# Patient Record
Sex: MALE | Race: OTHER
[De-identification: names, ages, dates, MRNs, and addresses within clinical notes are randomized per-mention and may not be internally consistent; named-entity substitution may affect disease eponyms.]

---

## 2018-11-03 ENCOUNTER — HOSPITAL ENCOUNTER (EMERGENCY)
Dept: HOSPITAL 72 - EMR | Age: 28
Discharge: HOME | End: 2018-11-03
Payer: MEDICAID

## 2018-11-03 VITALS — SYSTOLIC BLOOD PRESSURE: 153 MMHG | DIASTOLIC BLOOD PRESSURE: 94 MMHG

## 2018-11-03 VITALS — WEIGHT: 140 LBS | HEIGHT: 64 IN | BODY MASS INDEX: 23.9 KG/M2

## 2018-11-03 VITALS — DIASTOLIC BLOOD PRESSURE: 94 MMHG | SYSTOLIC BLOOD PRESSURE: 153 MMHG

## 2018-11-03 DIAGNOSIS — S29.012A: Primary | ICD-10-CM
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
## 2018-11-03 PROCEDURE — 99283 EMERGENCY DEPT VISIT LOW MDM: CPT

## 2018-11-03 NOTE — EMERGENCY ROOM REPORT
History of Present Illness


General


Chief Complaint:  Back Injury


Source:  Patient





Present Illness


HPI


28-year-old male presents to the emergency department complaining of 7 out of 

10 in severity localized pain that he describes as tight and cramping in nature 

in the left upper back 3 days.  Patient reports that he was lifting and 

pulling heavy objects at work and believes this is what caused the injury.  

Patient states that he did not report his injury to his employer and no longer 

works there either.  Patient denies trauma or fall he denies chest pain, 

shortness of breath or dyspnea.  Patient denies midline neck or back pain.  

Denies numbness tingling or loss of sensation or gross motor movements of the 

extremities, incontinence of bowel or bladder. Denies CP, Palpitations, LOC, AMS

, dizziness, Changes in Vision, weakness or a sudden severe headache.


Allergies:  


Coded Allergies:  


     No Known Allergies (Unverified , 11/3/18)





Patient History


Past Medical History:  see triage record


Past Surgical History:  none


Pertinent Family History:  none


Reviewed Nursing Documentation:  PMH: Agreed; PSxH: Agreed





Nursing Documentation-PMH


Past Medical History:  No History, Except For


Hx Gastrointestinal Problems:  Yes





Review of Systems


All Other Systems:  negative except mentioned in HPI





Physical Exam





Vital Signs








  Date Time  Temp Pulse Resp B/P (MAP) Pulse Ox O2 Delivery O2 Flow Rate FiO2


 


11/3/18 13:34 98.2 64 20 153/94 97 Room Air  








Sp02 EP Interpretation:  reviewed, normal


General Appearance:  no apparent distress, alert, GCS 15, non-toxic


Head:  normocephalic, atraumatic


Eyes:  bilateral eye normal inspection, bilateral eye PERRL


ENT:  hearing grossly normal, normal voice


Neck:  full range of motion


Respiratory:  chest non-tender, lungs clear, normal breath sounds, speaking 

full sentences


Cardiovascular #1:  regular rate, rhythm


Rectal:  deferred


Musculoskeletal:  back normal, gait/station normal, normal range of motion, 

other - no winged scapula on exam, tender - TTP left rhomboid and left sided 

thoracic paraspinal musculature, no bony ttp, FROM of shoulder.


Neurologic:  alert, oriented x3, responsive, motor strength/tone normal, 

sensory intact, normal gait, speech normal, grossly normal


Psychiatric:  judgement/insight normal


Skin:  normal color, no rash, warm/dry, well hydrated





Medical Decision Making


PA Attestation


Dr. Barnett  is my supervising Physician whom patient management has been 

discussed with.


Diagnostic Impression:  


 Primary Impression:  


 Muscle strain of left upper back


 Qualified Codes:  S29.012A - Strain of muscle and tendon of back wall of thorax

, initial encounter


 Additional Impressions:  


 Muscle spasm of back


 Rhomboid muscle strain


 Qualified Codes:  S29.012A - Strain of muscle and tendon of back wall of thorax

, initial encounter


ER Course


28-year-old male presents to the emergency department complaining of 7 out of 

10 in severity localized pain that he describes as tight and cramping in nature 

in the left upper back 3 days.  Patient reports that he was lifting and 

pulling heavy objects at work and believes this is what caused the injury.  

Patient states that he did not report his injury to his employer and no longer 

works there either.  Patient denies trauma or fall he denies chest pain, 

shortness of breath or dyspnea.  Patient denies midline neck or back pain.  

Denies numbness tingling or loss of sensation or gross motor movements of the 

extremities, incontinence of bowel or bladder. Denies CP, Palpitations, LOC, AMS

, dizziness, Changes in Vision, weakness or a sudden severe headache. 





Ddx considered but are not limited to Fracture, dislocation, contusion, 

epidural abscess, Sprain/Strain/Spasm





Vital signs: are WNL, pt. is afebrile


H&PE are most consistent with muscle strain and spasm of left upper back - 

rhomboid and left thoracic paraspinal musculature





ORDERS: none required at this time.





ED INTERVENTIONS: 


-Soma PO


-Tylenol PO


-Lidoderm TP





-I do not identify an emergent condition at this time. With current presentation

,  pt. is stable for close outpatient follow up and conservative treatment.  D/

w pt. to return promptly to ED with worsening or new symptoms.- Pt. verbalizes' 

understanding and agreement with proposed treatment plan.








DISCHARGE: At this time pt. is stable for d/c to home. Will provide printed 

patient care instructions, and any necessary prescriptions. Care plan and 

follow up instructions have been discussed with the patient prior to discharge.





Last Vital Signs








  Date Time  Temp Pulse Resp B/P (MAP) Pulse Ox O2 Delivery O2 Flow Rate FiO2


 


11/3/18 13:50 98.2 64 20 153/94 97 Room Air  








Disposition:  HOME, SELF-CARE


Condition:  Stable


Scripts


Acetaminophen* (TYLENOL EXTRA STRENGTH*) 500 Mg Tablet


500 MG ORAL Q6H, #20 TAB 0 Refills


   Prov: Vee Euceda         11/3/18 


Methocarbamol* (ROBAXIN*) 500 Mg Tablet


1000 MG PO TID for 7 Days, #42 TAB 0 Refills


   Prov: Vee Euceda         11/3/18


Referrals:  


NOT CHOSEN IPA/MD,REFERRING (PCP)


Patient Instructions:  Back Pain, Adult, Muscle Strain, Easy-to-Read





Additional Instructions:  


Take medications as directed. 


 ** Follow up with a Primary Care Provider in 3-5 days, even if your symptoms 

have resolved. ** 


--Please review list of primary care clinics, if you do not already have a 

primary care provider





Return sooner to ED if new symptoms occur, or current symptoms become worse. 


Do not drink alcohol, drive, or operate heavy machinery while taking Robaxin ( 

Muscle Relaxers) as this may cause drowsiness. 











- Please note that this Emergency Department Report was dictated using [employer] technology software, occasionally this can lead to 

erroneous entry secondary to interpretation by the dictation equipment.











Vee Euceda Nov 3, 2018 14:13

## 2018-12-10 ENCOUNTER — HOSPITAL ENCOUNTER (INPATIENT)
Dept: HOSPITAL 72 - EMR | Age: 28
LOS: 7 days | Discharge: HOME | DRG: 282 | End: 2018-12-17
Payer: MEDICAID

## 2018-12-10 VITALS — SYSTOLIC BLOOD PRESSURE: 135 MMHG | DIASTOLIC BLOOD PRESSURE: 93 MMHG

## 2018-12-10 VITALS — SYSTOLIC BLOOD PRESSURE: 154 MMHG | DIASTOLIC BLOOD PRESSURE: 90 MMHG

## 2018-12-10 VITALS — SYSTOLIC BLOOD PRESSURE: 166 MMHG | DIASTOLIC BLOOD PRESSURE: 113 MMHG

## 2018-12-10 VITALS — SYSTOLIC BLOOD PRESSURE: 158 MMHG | DIASTOLIC BLOOD PRESSURE: 93 MMHG

## 2018-12-10 VITALS — WEIGHT: 143 LBS | BODY MASS INDEX: 22.98 KG/M2 | HEIGHT: 66 IN

## 2018-12-10 DIAGNOSIS — E86.0: ICD-10-CM

## 2018-12-10 DIAGNOSIS — E87.6: ICD-10-CM

## 2018-12-10 DIAGNOSIS — F10.10: ICD-10-CM

## 2018-12-10 DIAGNOSIS — K85.20: Primary | ICD-10-CM

## 2018-12-10 DIAGNOSIS — E83.51: ICD-10-CM

## 2018-12-10 LAB
ADD MANUAL DIFF: NO
ALBUMIN SERPL-MCNC: 2.6 G/DL (ref 3.4–5)
ALBUMIN/GLOB SERPL: 0.9 {RATIO} (ref 1–2.7)
ALP SERPL-CCNC: 48 U/L (ref 46–116)
ALT SERPL-CCNC: 83 U/L (ref 12–78)
ANION GAP SERPL CALC-SCNC: 16 MMOL/L (ref 5–15)
AST SERPL-CCNC: 36 U/L (ref 15–37)
BASOPHILS NFR BLD AUTO: 0.7 % (ref 0–2)
BILIRUB SERPL-MCNC: 0.3 MG/DL (ref 0.2–1)
BUN SERPL-MCNC: 12 MG/DL (ref 7–18)
CALCIUM SERPL-MCNC: 5.6 MG/DL (ref 8.5–10.1)
CHLORIDE SERPL-SCNC: 113 MMOL/L (ref 98–107)
CO2 SERPL-SCNC: 16 MMOL/L (ref 21–32)
CREAT SERPL-MCNC: 0.5 MG/DL (ref 0.55–1.3)
EOSINOPHIL NFR BLD AUTO: 0 % (ref 0–3)
ERYTHROCYTE [DISTWIDTH] IN BLOOD BY AUTOMATED COUNT: 12 % (ref 11.6–14.8)
GLOBULIN SER-MCNC: 2.8 G/DL
HCT VFR BLD CALC: 51 % (ref 42–52)
HGB BLD-MCNC: 17.5 G/DL (ref 14.2–18)
LYMPHOCYTES NFR BLD AUTO: 17.7 % (ref 20–45)
MCV RBC AUTO: 83 FL (ref 80–99)
MONOCYTES NFR BLD AUTO: 4.9 % (ref 1–10)
NEUTROPHILS NFR BLD AUTO: 76.7 % (ref 45–75)
PLATELET # BLD: 349 K/UL (ref 150–450)
POTASSIUM SERPL-SCNC: 2.1 MMOL/L (ref 3.5–5.1)
RBC # BLD AUTO: 6.12 M/UL (ref 4.7–6.1)
SODIUM SERPL-SCNC: 144 MMOL/L (ref 136–145)
WBC # BLD AUTO: 9.8 K/UL (ref 4.8–10.8)

## 2018-12-10 PROCEDURE — 81001 URINALYSIS AUTO W/SCOPE: CPT

## 2018-12-10 PROCEDURE — 96375 TX/PRO/DX INJ NEW DRUG ADDON: CPT

## 2018-12-10 PROCEDURE — 87086 URINE CULTURE/COLONY COUNT: CPT

## 2018-12-10 PROCEDURE — 80053 COMPREHEN METABOLIC PANEL: CPT

## 2018-12-10 PROCEDURE — 96374 THER/PROPH/DIAG INJ IV PUSH: CPT

## 2018-12-10 PROCEDURE — 99285 EMERGENCY DEPT VISIT HI MDM: CPT

## 2018-12-10 PROCEDURE — 36415 COLL VENOUS BLD VENIPUNCTURE: CPT

## 2018-12-10 PROCEDURE — 96376 TX/PRO/DX INJ SAME DRUG ADON: CPT

## 2018-12-10 PROCEDURE — 85025 COMPLETE CBC W/AUTO DIFF WBC: CPT

## 2018-12-10 PROCEDURE — 83690 ASSAY OF LIPASE: CPT

## 2018-12-10 PROCEDURE — 74176 CT ABD & PELVIS W/O CONTRAST: CPT

## 2018-12-10 PROCEDURE — 96361 HYDRATE IV INFUSION ADD-ON: CPT

## 2018-12-10 RX ADMIN — LORAZEPAM PRN MG: 2 INJECTION, SOLUTION INTRAMUSCULAR; INTRAVENOUS at 22:34

## 2018-12-10 RX ADMIN — DEXTROSE MONOHYDRATE SCH MLS/HR: 50 INJECTION, SOLUTION INTRAVENOUS at 16:41

## 2018-12-10 RX ADMIN — MORPHINE SULFATE PRN MG: 2 INJECTION, SOLUTION INTRAMUSCULAR; INTRAVENOUS at 21:01

## 2018-12-10 RX ADMIN — HEPARIN SODIUM SCH UNITS: 5000 INJECTION INTRAVENOUS; SUBCUTANEOUS at 21:01

## 2018-12-10 RX ADMIN — MORPHINE SULFATE PRN MG: 2 INJECTION, SOLUTION INTRAMUSCULAR; INTRAVENOUS at 23:50

## 2018-12-10 RX ADMIN — DEXTROSE MONOHYDRATE SCH MLS/HR: 50 INJECTION, SOLUTION INTRAVENOUS at 23:55

## 2018-12-10 NOTE — HISTORY AND PHYSICAL REPORT
DATE OF ADMISSION:  12/10/2018

CHIEF COMPLAINT:  Pancreatitis.



HISTORY OF PRESENT ILLNESS:  The patient is a 28-year-old male.  He has a

history of pancreatitis in the past.  His last attack was about 6 months

ago.  According to the patient, it was related to alcohol use.  He had

been abstinent until 5 to 6 days prior to admission when he began to drink

again.  According to the patient, he only drinks 2 to 3 beers a day.  He

developed abdominal pain starting the day prior to admission, presented to

the emergency room.  He denies any fevers or chills.  No melena.  No

bright red blood per rectum.  On evaluation in the ER, he was noted to

have a lipase of 516, sodium is 144, potassium is 2.1, bicarb of 16, and

calcium of 5.6.  He had a white count of 10,000.  The patient will be

given IV fluids and pain medications and now admitted with presumptive

diagnosis of acute pancreatitis due to alcohol use.



PAST MEDICAL HISTORY:  As above.



PAST SURGICAL HISTORY:  None.



CURRENT MEDICATIONS:  Reconciled and reviewed.



ALLERGIES:  None.



FAMILY HISTORY:  None.



SOCIAL HISTORY:  The patient drinks 2 to 3 beers a day for the last 5 to 6

years.  On no drugs.



PHYSICAL EXAMINATION:

VITAL SIGNS:  Temperature 97.7, pulse 64, respirations 17, and blood

pressure 166/113.

GENERAL:  The patient is well-developed and in no apparent

distress.

HEART:  Regular rate and rhythm.

LUNGS:  Clear.

ABDOMEN:  Soft.  Diffusely tender with no rebound or guarding.

EXTREMITIES:  No clubbing, cyanosis, or edema.



LABORATORY DATA:  Lipase is 516.  White count 10, hemoglobin 17, and

platelet count of 349,000.  Sodium 144, potassium 2.1, chloride 113,

bicarb 16, BUN was 12, and creatinine was 0.5.  Calcium 5.6.



ASSESSMENT:  This is an elderly 28-year-old male admitted with complaints

of acute pancreatitis secondary to alcohol use.



1. Acute pancreatitis.

2. Dehydration.

3. Hypokalemia.

4. Hypocalcemia.



PLAN:

1. Intravenous fluids and intravenous antibiotics.

2. Check a CT scan of the abdomen.

3. NPO.

4. Intravenous pain medications and antiemetics as needed.









  ______________________________________________

  Heriberto Thapa M.D.





DR:  [place]

D:  12/10/2018 14:40

T:  12/10/2018 20:11

JOB#:  [id number]

CC:

## 2018-12-10 NOTE — EMERGENCY ROOM REPORT
History of Present Illness


General


Chief Complaint:  Nausea


Source:  Patient





Present Illness


HPI


28-year-old male presents ED for evaluation.  Complaining of abdominal pain and 

vomiting 2 days.  Pain is epigastric, sharp, 10 out of 10, nonradiating.  

Notes history of alcohol abuse.  States that last time he had similar pain he 

had pancreatitis.  Denies chest pain or shortness of breath.  Denies fevers 

chills.  No other aggravating relieving factors.  Denies any other associated 

symptoms


Allergies:  


Coded Allergies:  


     No Known Allergies (Unverified , 11/3/18)





Patient History


Past Medical History:  other - pancreatitis


Past Surgical History:  none


Pertinent Family History:  none


Social History:  Reports: alcohol use; Denies: smoking, drug use


Immunizations:  UTD


Reviewed Nursing Documentation:  PMH: Agreed; PSxH: Agreed





Nursing Documentation-PMH


Past Medical History:  No History, Except For


Hx Gastrointestinal Problems:  Yes - pancreatitis, alcohol abuse





Review of Systems


All Other Systems:  negative except mentioned in HPI





Physical Exam





Vital Signs








  Date Time  Temp Pulse Resp B/P (MAP) Pulse Ox O2 Delivery O2 Flow Rate FiO2


 


12/10/18 11:35 98.1 76 22 157/110 98 Room Air  








Sp02 EP Interpretation:  reviewed, normal


General Appearance:  alert, GCS 15, non-toxic, mild distress


Head:  normocephalic, atraumatic


Eyes:  bilateral eye normal inspection, bilateral eye PERRL


ENT:  hearing grossly normal, normal pharynx, no angioedema, normal voice


Neck:  full range of motion, supple/symm/no masses


Respiratory:  chest non-tender, lungs clear, normal breath sounds, speaking 

full sentences


Cardiovascular #1:  regular rate, rhythm, no edema


Cardiovascular #2:  2+ carotid (R), 2+ carotid (L), 2+ radial (R), 2+ radial (L)

, 2+ dorsalis pedis (R), 2+ dorsalis pedis (L)


Gastrointestinal:  normal bowel sounds, soft, non-distended, no guarding, no 

rebound, tenderness - epigastric


Rectal:  deferred


Genitourinary:  normal inspection, no CVA tenderness


Musculoskeletal:  back normal, gait/station normal, normal range of motion, non-

tender


Neurologic:  alert, oriented x3, responsive, motor strength/tone normal, 

sensory intact, speech normal


Psychiatric:  judgement/insight normal, memory normal, mood/affect normal, no 

suicidal/homicidal ideation


Reflexes:  3+ bicep (R), 3+ bicep (L), 3+ tricep (R), 3+ tricep (L), 3+ knee (R)

, 3+ knee (L)


Skin:  normal color, no rash, warm/dry, well hydrated


Lymphatic:  no adenopathy





Medical Decision Making


Diagnostic Impression:  


 Primary Impression:  


 Pancreatitis


 Qualified Codes:  K85.20 - Alcohol induced acute pancreatitis without necrosis 

or infection


 Additional Impressions:  


 Hypokalemia


 Hypocalcemia


ER Course


Hospital Course 


28-year-old male presents to ED with abdominal pain, vomiting 





Differential diagnoses include: BPH, cystitis, pyelonephritis, kidney stone 





Clinical course


Patient placed on stretcher.  Cardiac monitor.  After initial history and 

physical I ordered labs, IV fluids, pain medication





Labs - no leukocytosis, Hb/Hct stable.  K and Ca low. lipase elevated





patient continues to have pain and vomiting.  Potassium and calcium repleted.  

Additional pain meds given.  IV hydration continued.





Blood pressure elevated.  No history of hypertension.  Given one dose of 

hydralazine with blood pressure improved





Case discussed with Dr. Thapa and he agreed to accept the patient to his 

service for further care and support 





I feel this is a highly complex case requiring extensive working including EKG/

Rhythm strip, Xray/CT/US, Blood/urine lab work, repeat exams while in ED, and 

administration of strong opiates/narcotics for pain control, admission to 

hospital or close patient follow up.  





Diagnosis - acute pancreatitis, hypokalemia, hypocalcemia 





Patient admitted to floor in stable condition





Labs








Test


  12/10/18


12:10


 


White Blood Count


  9.8 K/UL


(4.8-10.8)


 


Red Blood Count


  6.12 M/UL


(4.70-6.10)


 


Hemoglobin


  17.5 G/DL


(14.2-18.0)


 


Hematocrit


  51.0 %


(42.0-52.0)


 


Mean Corpuscular Volume 83 FL (80-99) 


 


Mean Corpuscular Hemoglobin


  28.5 PG


(27.0-31.0)


 


Mean Corpuscular Hemoglobin


Concent 34.2 G/DL


(32.0-36.0)


 


Red Cell Distribution Width


  12.0 %


(11.6-14.8)


 


Platelet Count


  349 K/UL


(150-450)


 


Mean Platelet Volume


  6.0 FL


(6.5-10.1)


 


Neutrophils (%) (Auto)


  76.7 %


(45.0-75.0)


 


Lymphocytes (%) (Auto)


  17.7 %


(20.0-45.0)


 


Monocytes (%) (Auto)


  4.9 %


(1.0-10.0)


 


Eosinophils (%) (Auto)


  0.0 %


(0.0-3.0)


 


Basophils (%) (Auto)


  0.7 %


(0.0-2.0)


 


Sodium Level


  144 MMOL/L


(136-145)


 


Potassium Level


  2.1 MMOL/L


(3.5-5.1)


 


Chloride Level


  113 MMOL/L


()


 


Carbon Dioxide Level


  16 MMOL/L


(21-32)


 


Anion Gap


  16 mmol/L


(5-15)


 


Blood Urea Nitrogen


  12 mg/dL


(7-18)


 


Creatinine


  0.5 MG/DL


(0.55-1.30)


 


Estimat Glomerular Filtration


Rate > 60 mL/min


(>60)


 


Glucose Level


  117 MG/DL


()


 


Calcium Level


  5.6 MG/DL


(8.5-10.1)


 


Total Bilirubin


  0.3 MG/DL


(0.2-1.0)


 


Aspartate Amino Transf


(AST/SGOT) 36 U/L (15-37) 


 


 


Alanine Aminotransferase


(ALT/SGPT) 83 U/L (12-78) 


 


 


Alkaline Phosphatase


  48 U/L


()


 


Total Protein


  5.4 G/DL


(6.4-8.2)


 


Albumin


  2.6 G/DL


(3.4-5.0)


 


Globulin 2.8 g/dL 


 


Albumin/Globulin Ratio 0.9 (1.0-2.7) 


 


Lipase


  516 U/L


()











Last Vital Signs








  Date Time  Temp Pulse Resp B/P (MAP) Pulse Ox O2 Delivery O2 Flow Rate FiO2


 


12/10/18 13:59    155/105    


 


12/10/18 13:24  79 15  100 Room Air  


 


12/10/18 11:48 97.7       








Status:  improved


Disposition:  ADMITTED AS INPATIENT


Condition:  Serious


Referrals:  


NOT CHOSEN IPA/MD,REFERRING (PCP)











Cal Antunez MD Dec 10, 2018 14:22

## 2018-12-10 NOTE — DIAGNOSTIC IMAGING REPORT
Indication: Abdominal pain

 

Technique: Continuous helical transaxial imaging of the abdomen and pelvis was

obtained from the lung bases to the pubic symphysis. No intravenous contrast was

administered. Coronal 2-D reformats were also obtained. Automatic Exposure Control

was utilized.

 

 

Total Dose length Product (DLP):  598.31 mGycm

 

CT Dose Index Volume (CTDIvol):   11.22 mGy

 

Comparison: none

 

Findings: There is a moderate degree of peripancreatic inflammation soft tissue

stranding consistent with acute pancreatitis. There is no biliary ductal dilatation.

Gallbladder is unremarkable in appearance. The appendix is normal. Small hiatal

hernia noted. No bowel obstruction or significant free fluid identified. Small

bilateral inguinal hernias containing fat demonstrated. The urinary bladder is

unremarkable. There is no hydronephrosis. Punctate nonobstructive stone incidentally

demonstrated in the lower pole the right kidney.

 

IMPRESSION:

 

Acute pancreatitis.

 

Nonobstructive stone in the right kidney

 

Small bilateral inguinal hernias containing fat.

 

Hiatal hernia

 

 

The CT scanner at [facility] is accredited by the [employer] College of

Radiology and the scans are performed using dose optimization techniques as

appropriate to a performed exam including Automatic Exposure control.

## 2018-12-11 VITALS — SYSTOLIC BLOOD PRESSURE: 183 MMHG | DIASTOLIC BLOOD PRESSURE: 104 MMHG

## 2018-12-11 VITALS — DIASTOLIC BLOOD PRESSURE: 106 MMHG | SYSTOLIC BLOOD PRESSURE: 165 MMHG

## 2018-12-11 VITALS — SYSTOLIC BLOOD PRESSURE: 146 MMHG | DIASTOLIC BLOOD PRESSURE: 84 MMHG

## 2018-12-11 VITALS — DIASTOLIC BLOOD PRESSURE: 101 MMHG | SYSTOLIC BLOOD PRESSURE: 147 MMHG

## 2018-12-11 VITALS — SYSTOLIC BLOOD PRESSURE: 169 MMHG | DIASTOLIC BLOOD PRESSURE: 99 MMHG

## 2018-12-11 VITALS — SYSTOLIC BLOOD PRESSURE: 171 MMHG | DIASTOLIC BLOOD PRESSURE: 107 MMHG

## 2018-12-11 VITALS — SYSTOLIC BLOOD PRESSURE: 163 MMHG | DIASTOLIC BLOOD PRESSURE: 94 MMHG

## 2018-12-11 VITALS — DIASTOLIC BLOOD PRESSURE: 99 MMHG | SYSTOLIC BLOOD PRESSURE: 169 MMHG

## 2018-12-11 LAB
ADD MANUAL DIFF: NO
ALBUMIN SERPL-MCNC: 4.1 G/DL (ref 3.4–5)
ALBUMIN/GLOB SERPL: 1 {RATIO} (ref 1–2.7)
ALP SERPL-CCNC: 71 U/L (ref 46–116)
ALT SERPL-CCNC: 98 U/L (ref 12–78)
ANION GAP SERPL CALC-SCNC: 14 MMOL/L (ref 5–15)
AST SERPL-CCNC: 34 U/L (ref 15–37)
BASOPHILS NFR BLD AUTO: 0.4 % (ref 0–2)
BILIRUB SERPL-MCNC: 1 MG/DL (ref 0.2–1)
BUN SERPL-MCNC: 13 MG/DL (ref 7–18)
CALCIUM SERPL-MCNC: 8.8 MG/DL (ref 8.5–10.1)
CHLORIDE SERPL-SCNC: 99 MMOL/L (ref 98–107)
CO2 SERPL-SCNC: 21 MMOL/L (ref 21–32)
CREAT SERPL-MCNC: 0.9 MG/DL (ref 0.55–1.3)
EOSINOPHIL NFR BLD AUTO: 0.2 % (ref 0–3)
ERYTHROCYTE [DISTWIDTH] IN BLOOD BY AUTOMATED COUNT: 11.9 % (ref 11.6–14.8)
GLOBULIN SER-MCNC: 4 G/DL
HCT VFR BLD CALC: 47.7 % (ref 42–52)
HGB BLD-MCNC: 16.4 G/DL (ref 14.2–18)
LYMPHOCYTES NFR BLD AUTO: 11.1 % (ref 20–45)
MCV RBC AUTO: 84 FL (ref 80–99)
MONOCYTES NFR BLD AUTO: 6.8 % (ref 1–10)
NEUTROPHILS NFR BLD AUTO: 81.6 % (ref 45–75)
PLATELET # BLD: 307 K/UL (ref 150–450)
POTASSIUM SERPL-SCNC: 4.1 MMOL/L (ref 3.5–5.1)
RBC # BLD AUTO: 5.67 M/UL (ref 4.7–6.1)
SODIUM SERPL-SCNC: 134 MMOL/L (ref 136–145)
WBC # BLD AUTO: 17.4 K/UL (ref 4.8–10.8)

## 2018-12-11 RX ADMIN — DEXTROSE MONOHYDRATE SCH MLS/HR: 50 INJECTION, SOLUTION INTRAVENOUS at 15:50

## 2018-12-11 RX ADMIN — MORPHINE SULFATE PRN MG: 2 INJECTION, SOLUTION INTRAMUSCULAR; INTRAVENOUS at 08:39

## 2018-12-11 RX ADMIN — PANTOPRAZOLE SODIUM SCH MG: 40 INJECTION, POWDER, FOR SOLUTION INTRAVENOUS at 08:39

## 2018-12-11 RX ADMIN — HEPARIN SODIUM SCH UNITS: 5000 INJECTION INTRAVENOUS; SUBCUTANEOUS at 21:44

## 2018-12-11 RX ADMIN — MORPHINE SULFATE PRN MG: 2 INJECTION, SOLUTION INTRAMUSCULAR; INTRAVENOUS at 21:43

## 2018-12-11 RX ADMIN — MORPHINE SULFATE PRN MG: 2 INJECTION, SOLUTION INTRAMUSCULAR; INTRAVENOUS at 14:54

## 2018-12-11 RX ADMIN — LORAZEPAM PRN MG: 2 INJECTION, SOLUTION INTRAMUSCULAR; INTRAVENOUS at 04:16

## 2018-12-11 RX ADMIN — HEPARIN SODIUM SCH UNITS: 5000 INJECTION INTRAVENOUS; SUBCUTANEOUS at 08:41

## 2018-12-11 RX ADMIN — MORPHINE SULFATE PRN MG: 2 INJECTION, SOLUTION INTRAMUSCULAR; INTRAVENOUS at 11:46

## 2018-12-11 RX ADMIN — MORPHINE SULFATE PRN MG: 2 INJECTION, SOLUTION INTRAMUSCULAR; INTRAVENOUS at 18:38

## 2018-12-11 RX ADMIN — DEXTROSE MONOHYDRATE SCH MLS/HR: 50 INJECTION, SOLUTION INTRAVENOUS at 08:39

## 2018-12-11 RX ADMIN — MORPHINE SULFATE PRN MG: 2 INJECTION, SOLUTION INTRAMUSCULAR; INTRAVENOUS at 02:37

## 2018-12-11 RX ADMIN — MORPHINE SULFATE PRN MG: 2 INJECTION, SOLUTION INTRAMUSCULAR; INTRAVENOUS at 05:23

## 2018-12-11 NOTE — GENERAL PROGRESS NOTE
Assessment/Plan


Problem List:  


(1) Pancreatitis


ICD Codes:  K85.90 - Acute pancreatitis without necrosis or infection, 

unspecified


SNOMED:  89071082


Qualifiers:  


   Qualified Codes:  K85.20 - Alcohol induced acute pancreatitis without 

necrosis or infection


Status:  stable, progressing


Assessment/Plan


npo


pain rx as is 


ivf


follow up labs





Subjective


ROS Limited/Unobtainable:  No


Constitutional:  Reports: malaise, weakness


HEENT:  Reports: no symptoms


Cardiovascular:  Reports: no symptoms


Respiratory:  Reports: no symptoms


Gastrointestinal/Abdominal:  Reports: abdominal pain


Genitourinary:  Reports: no symptoms


Neurologic/Psychiatric:  Reports: no symptoms


Endocrine:  Reports: no symptoms


Hematologic/Lymphatic:  Reports: no symptoms


Allergies:  


Coded Allergies:  


     No Known Allergies (Unverified , 11/3/18)


All Systems:  reviewed and negative except above


Subjective


no events. c/o abd pain. appears comfortable. labs pending





Objective





Last 24 Hour Vital Signs








  Date Time  Temp Pulse Resp B/P (MAP) Pulse Ox O2 Delivery O2 Flow Rate FiO2


 


12/11/18 08:00 98.4 67 16 165/106 (125) 98   


 


12/11/18 05:00    165/92    


 


12/11/18 04:00 98.1 55 17 183/104 (130)    


 


12/11/18 00:00 98.3 75 18 146/84 (104)    


 


12/10/18 21:31 98.3       


 


12/10/18 21:19      Room Air  


 


12/10/18 20:00 98.1 70 17 154/90 (111) 98   


 


12/10/18 16:09 98.3 88 18 135/93 (107) 98   


 


12/10/18 15:38      Room Air  


 


12/10/18 14:33 97.5 85 16 133/81 100 Room Air  


 


12/10/18 13:59    155/105    


 


12/10/18 13:24  79 15 158/93 100 Room Air  


 


12/10/18 11:48 97.7 64 17 166/113 100 Room Air  


 


12/10/18 11:35 98.1 76 22 157/110 98 Room Air  

















Intake and Output  


 


 12/10/18 12/11/18





 19:00 07:00


 


Intake Total 250 ml 1360.0 ml


 


Balance 250 ml 1360.0 ml


 


  


 


Intake IV Total 250 ml 1360.0 ml


 


# Voids 3 4








Laboratory Tests


12/10/18 12:10: 


White Blood Count 9.8, Red Blood Count 6.12H, Hemoglobin 17.5, Hematocrit 51.0, 

Mean Corpuscular Volume 83, Mean Corpuscular Hemoglobin 28.5, Mean Corpuscular 

Hemoglobin Concent 34.2, Red Cell Distribution Width 12.0, Platelet Count 349, 

Mean Platelet Volume 6.0L, Neutrophils (%) (Auto) 76.7H, Lymphocytes (%) (Auto) 

17.7L, Monocytes (%) (Auto) 4.9, Eosinophils (%) (Auto) 0.0, Basophils (%) (Auto

) 0.7, Sodium Level 144, Potassium Level 2.1*L, Chloride Level 113H, Carbon 

Dioxide Level 16L, Anion Gap 16H, Blood Urea Nitrogen 12, Creatinine 0.5L, 

Estimat Glomerular Filtration Rate > 60, Glucose Level 117H, Calcium Level 5.6*L

, Total Bilirubin 0.3, Aspartate Amino Transf (AST/SGOT) 36, Alanine 

Aminotransferase (ALT/SGPT) 83H, Alkaline Phosphatase 48, Total Protein 5.4L, 

Albumin 2.6L, Globulin 2.8, Albumin/Globulin Ratio 0.9L, Lipase 516H


Height (Feet):  5


Height (Inches):  6.00


Weight (Pounds):  140


General Appearance:  WD/WN, no apparent distress, alert


Neck:  supple


Cardiovascular:  regular rhythm


Respiratory/Chest:  lungs clear


Abdomen:  tender


Edema:  no edema noted Arm (L), no edema noted Arm (R), no edema noted Leg (L), 

no edema noted Leg (R), no edema noted Pedal (L), no edema noted Pedal (R), no 

edema noted Generalized











Heriberto Thapa MD Dec 11, 2018 08:51

## 2018-12-12 VITALS — SYSTOLIC BLOOD PRESSURE: 155 MMHG | DIASTOLIC BLOOD PRESSURE: 102 MMHG

## 2018-12-12 VITALS — DIASTOLIC BLOOD PRESSURE: 97 MMHG | SYSTOLIC BLOOD PRESSURE: 149 MMHG

## 2018-12-12 VITALS — SYSTOLIC BLOOD PRESSURE: 149 MMHG | DIASTOLIC BLOOD PRESSURE: 96 MMHG

## 2018-12-12 VITALS — SYSTOLIC BLOOD PRESSURE: 152 MMHG | DIASTOLIC BLOOD PRESSURE: 99 MMHG

## 2018-12-12 VITALS — DIASTOLIC BLOOD PRESSURE: 87 MMHG | SYSTOLIC BLOOD PRESSURE: 133 MMHG

## 2018-12-12 VITALS — SYSTOLIC BLOOD PRESSURE: 156 MMHG | DIASTOLIC BLOOD PRESSURE: 97 MMHG

## 2018-12-12 LAB
ALBUMIN SERPL-MCNC: 3.6 G/DL (ref 3.4–5)
ALBUMIN/GLOB SERPL: 0.8 {RATIO} (ref 1–2.7)
ALP SERPL-CCNC: 74 U/L (ref 46–116)
ALT SERPL-CCNC: 64 U/L (ref 12–78)
ANION GAP SERPL CALC-SCNC: 9 MMOL/L (ref 5–15)
APPEARANCE UR: CLEAR
APTT PPP: (no result) S
AST SERPL-CCNC: 25 U/L (ref 15–37)
BILIRUB SERPL-MCNC: 1 MG/DL (ref 0.2–1)
BUN SERPL-MCNC: 10 MG/DL (ref 7–18)
CALCIUM SERPL-MCNC: 8.8 MG/DL (ref 8.5–10.1)
CHLORIDE SERPL-SCNC: 98 MMOL/L (ref 98–107)
CO2 SERPL-SCNC: 26 MMOL/L (ref 21–32)
CREAT SERPL-MCNC: 1 MG/DL (ref 0.55–1.3)
GLOBULIN SER-MCNC: 4.7 G/DL
GLUCOSE UR STRIP-MCNC: NEGATIVE MG/DL
KETONES UR QL STRIP: NEGATIVE
LEUKOCYTE ESTERASE UR QL STRIP: NEGATIVE
NITRITE UR QL STRIP: NEGATIVE
PH UR STRIP: 8 [PH] (ref 4.5–8)
POTASSIUM SERPL-SCNC: 3.8 MMOL/L (ref 3.5–5.1)
PROT UR QL STRIP: NEGATIVE
SODIUM SERPL-SCNC: 133 MMOL/L (ref 136–145)
SP GR UR STRIP: 1.01 (ref 1–1.03)
UROBILINOGEN UR-MCNC: NORMAL MG/DL (ref 0–1)

## 2018-12-12 RX ADMIN — MORPHINE SULFATE PRN MG: 2 INJECTION, SOLUTION INTRAMUSCULAR; INTRAVENOUS at 03:49

## 2018-12-12 RX ADMIN — MORPHINE SULFATE PRN MG: 2 INJECTION, SOLUTION INTRAMUSCULAR; INTRAVENOUS at 13:06

## 2018-12-12 RX ADMIN — DEXTROSE MONOHYDRATE SCH MLS/HR: 50 INJECTION, SOLUTION INTRAVENOUS at 00:27

## 2018-12-12 RX ADMIN — MORPHINE SULFATE PRN MG: 2 INJECTION, SOLUTION INTRAMUSCULAR; INTRAVENOUS at 00:38

## 2018-12-12 RX ADMIN — DEXTROSE MONOHYDRATE SCH MLS/HR: 50 INJECTION, SOLUTION INTRAVENOUS at 08:30

## 2018-12-12 RX ADMIN — PANTOPRAZOLE SODIUM SCH MG: 40 INJECTION, POWDER, FOR SOLUTION INTRAVENOUS at 08:31

## 2018-12-12 RX ADMIN — DEXTROSE MONOHYDRATE SCH MLS/HR: 50 INJECTION, SOLUTION INTRAVENOUS at 16:30

## 2018-12-12 RX ADMIN — MORPHINE SULFATE PRN MG: 2 INJECTION, SOLUTION INTRAMUSCULAR; INTRAVENOUS at 09:56

## 2018-12-12 RX ADMIN — HEPARIN SODIUM SCH UNITS: 5000 INJECTION INTRAVENOUS; SUBCUTANEOUS at 08:38

## 2018-12-12 RX ADMIN — HEPARIN SODIUM SCH UNITS: 5000 INJECTION INTRAVENOUS; SUBCUTANEOUS at 20:25

## 2018-12-12 RX ADMIN — MORPHINE SULFATE PRN MG: 2 INJECTION, SOLUTION INTRAMUSCULAR; INTRAVENOUS at 16:32

## 2018-12-12 RX ADMIN — MORPHINE SULFATE PRN MG: 2 INJECTION, SOLUTION INTRAMUSCULAR; INTRAVENOUS at 06:48

## 2018-12-12 RX ADMIN — MORPHINE SULFATE PRN MG: 2 INJECTION, SOLUTION INTRAMUSCULAR; INTRAVENOUS at 22:39

## 2018-12-12 NOTE — GENERAL PROGRESS NOTE
Assessment/Plan


Problem List:  


(1) Pancreatitis


ICD Codes:  K85.90 - Acute pancreatitis without necrosis or infection, 

unspecified


SNOMED:  22341578


Qualifiers:  


   Qualified Codes:  K85.20 - Alcohol induced acute pancreatitis without 

necrosis or infection


Status:  stable


Assessment/Plan


npo


pain rx as is 


ivf


follow up labs


consider clears if lipase improving





Subjective


ROS Limited/Unobtainable:  No


Constitutional:  Reports: malaise, weakness


HEENT:  Reports: no symptoms


Cardiovascular:  Reports: no symptoms


Respiratory:  Reports: no symptoms


Gastrointestinal/Abdominal:  Reports: abdominal pain


Genitourinary:  Reports: no symptoms


Neurologic/Psychiatric:  Reports: no symptoms


Endocrine:  Reports: no symptoms


Hematologic/Lymphatic:  Reports: anemia


Allergies:  


Coded Allergies:  


     No Known Allergies (Unverified , 11/3/18)


All Systems:  reviewed and negative except above


Subjective


no events. c/o abd pain. appears comfortable. labs pending





Objective





Last 24 Hour Vital Signs








  Date Time  Temp Pulse Resp B/P (MAP) Pulse Ox O2 Delivery O2 Flow Rate FiO2


 


12/12/18 04:00 100.3 65 16 152/99 (116)    


 


12/12/18 00:00 99.5 64 18 155/102 (119)    


 


12/11/18 22:00      Room Air  


 


12/11/18 20:00 100.1 59 18 147/101 (116) 98   


 


12/11/18 16:58    163/94 (117)    


 


12/11/18 16:00 98.2 69 16 169/99 (122) 99   


 


12/11/18 15:56    169/99    


 


12/11/18 15:35    169/99 (122)    


 


12/11/18 12:00 98.2 63 23 171/107 (128) 100   


 


12/11/18 11:51    163/112    


 


12/11/18 09:00      Room Air  

















Intake and Output  


 


 12/11/18 12/12/18





 19:00 07:00


 


Intake Total 1290.0 ml 1485.0 ml


 


Balance 1290.0 ml 1485.0 ml


 


  


 


Intake IV Total 1290.0 ml 1485.0 ml


 


# Voids 5 








Height (Feet):  5


Height (Inches):  6.00


Weight (Pounds):  143


General Appearance:  WD/WN


Cardiovascular:  regular rhythm


Respiratory/Chest:  normal breath sounds


Abdomen:  tender


Edema:  no edema noted Arm (L), no edema noted Arm (R), no edema noted Leg (L), 

no edema noted Leg (R), no edema noted Pedal (L), no edema noted Pedal (R), no 

edema noted Generalized











Heriberto Thapa MD Dec 12, 2018 08:27

## 2018-12-13 VITALS — DIASTOLIC BLOOD PRESSURE: 93 MMHG | SYSTOLIC BLOOD PRESSURE: 145 MMHG

## 2018-12-13 VITALS — SYSTOLIC BLOOD PRESSURE: 150 MMHG | DIASTOLIC BLOOD PRESSURE: 101 MMHG

## 2018-12-13 VITALS — SYSTOLIC BLOOD PRESSURE: 152 MMHG | DIASTOLIC BLOOD PRESSURE: 95 MMHG

## 2018-12-13 VITALS — SYSTOLIC BLOOD PRESSURE: 137 MMHG | DIASTOLIC BLOOD PRESSURE: 92 MMHG

## 2018-12-13 VITALS — SYSTOLIC BLOOD PRESSURE: 130 MMHG | DIASTOLIC BLOOD PRESSURE: 92 MMHG

## 2018-12-13 VITALS — SYSTOLIC BLOOD PRESSURE: 155 MMHG | DIASTOLIC BLOOD PRESSURE: 89 MMHG

## 2018-12-13 LAB
ALBUMIN SERPL-MCNC: 3.6 G/DL (ref 3.4–5)
ALBUMIN/GLOB SERPL: 0.8 {RATIO} (ref 1–2.7)
ALP SERPL-CCNC: 84 U/L (ref 46–116)
ALT SERPL-CCNC: 58 U/L (ref 12–78)
ANION GAP SERPL CALC-SCNC: 9 MMOL/L (ref 5–15)
AST SERPL-CCNC: 30 U/L (ref 15–37)
BILIRUB SERPL-MCNC: 0.7 MG/DL (ref 0.2–1)
BUN SERPL-MCNC: 12 MG/DL (ref 7–18)
CALCIUM SERPL-MCNC: 8.8 MG/DL (ref 8.5–10.1)
CHLORIDE SERPL-SCNC: 100 MMOL/L (ref 98–107)
CO2 SERPL-SCNC: 27 MMOL/L (ref 21–32)
CREAT SERPL-MCNC: 0.9 MG/DL (ref 0.55–1.3)
GLOBULIN SER-MCNC: 4.5 G/DL
POTASSIUM SERPL-SCNC: 4.2 MMOL/L (ref 3.5–5.1)
SODIUM SERPL-SCNC: 135 MMOL/L (ref 136–145)

## 2018-12-13 RX ADMIN — MORPHINE SULFATE PRN MG: 2 INJECTION, SOLUTION INTRAMUSCULAR; INTRAVENOUS at 03:15

## 2018-12-13 RX ADMIN — DEXTROSE MONOHYDRATE SCH MLS/HR: 50 INJECTION, SOLUTION INTRAVENOUS at 00:01

## 2018-12-13 RX ADMIN — MORPHINE SULFATE PRN MG: 2 INJECTION, SOLUTION INTRAMUSCULAR; INTRAVENOUS at 09:28

## 2018-12-13 RX ADMIN — MORPHINE SULFATE PRN MG: 2 INJECTION, SOLUTION INTRAMUSCULAR; INTRAVENOUS at 06:27

## 2018-12-13 RX ADMIN — PANTOPRAZOLE SODIUM SCH MG: 40 INJECTION, POWDER, FOR SOLUTION INTRAVENOUS at 09:24

## 2018-12-13 RX ADMIN — MORPHINE SULFATE PRN MG: 2 INJECTION, SOLUTION INTRAMUSCULAR; INTRAVENOUS at 19:51

## 2018-12-13 RX ADMIN — HEPARIN SODIUM SCH UNITS: 5000 INJECTION INTRAVENOUS; SUBCUTANEOUS at 09:27

## 2018-12-13 RX ADMIN — HEPARIN SODIUM SCH UNITS: 5000 INJECTION INTRAVENOUS; SUBCUTANEOUS at 20:27

## 2018-12-13 RX ADMIN — MORPHINE SULFATE PRN MG: 2 INJECTION, SOLUTION INTRAMUSCULAR; INTRAVENOUS at 23:25

## 2018-12-13 RX ADMIN — DEXTROSE MONOHYDRATE SCH MLS/HR: 50 INJECTION, SOLUTION INTRAVENOUS at 15:56

## 2018-12-13 RX ADMIN — MORPHINE SULFATE PRN MG: 2 INJECTION, SOLUTION INTRAMUSCULAR; INTRAVENOUS at 15:56

## 2018-12-13 RX ADMIN — DEXTROSE MONOHYDRATE SCH MLS/HR: 50 INJECTION, SOLUTION INTRAVENOUS at 09:13

## 2018-12-13 RX ADMIN — MORPHINE SULFATE PRN MG: 2 INJECTION, SOLUTION INTRAMUSCULAR; INTRAVENOUS at 12:27

## 2018-12-13 RX ADMIN — DEXTROSE MONOHYDRATE SCH MLS/HR: 50 INJECTION, SOLUTION INTRAVENOUS at 23:30

## 2018-12-13 NOTE — GENERAL PROGRESS NOTE
Assessment/Plan


Problem List:  


(1) Pancreatitis


ICD Codes:  K85.90 - Acute pancreatitis without necrosis or infection, 

unspecified


SNOMED:  11590191


Qualifiers:  


   Qualified Codes:  K85.20 - Alcohol induced acute pancreatitis without 

necrosis or infection


Status:  stable, progressing


Assessment/Plan


clears


pain rx as is 


ivf


follow up labs tomorrow





Subjective


ROS Limited/Unobtainable:  No


Constitutional:  Reports: malaise, weakness


HEENT:  Reports: no symptoms


Cardiovascular:  Reports: no symptoms


Respiratory:  Reports: no symptoms


Gastrointestinal/Abdominal:  Reports: abdominal pain


Genitourinary:  Reports: no symptoms


Neurologic/Psychiatric:  Reports: no symptoms


Endocrine:  Reports: no symptoms


Hematologic/Lymphatic:  Reports: no symptoms


Allergies:  


Coded Allergies:  


     No Known Allergies (Unverified , 11/3/18)


All Systems:  reviewed and negative except above


Subjective


no events. c/o abd pain. appears comfortable. lipase better





Objective





Last 24 Hour Vital Signs








  Date Time  Temp Pulse Resp B/P (MAP) Pulse Ox O2 Delivery O2 Flow Rate FiO2


 


12/13/18 08:00 98.5 78 19 137/92 (107) 98   


 


12/13/18 04:00 98.7 57 16 130/92 (105) 99   


 


12/13/18 00:00 97.8 56 18 145/93 (110) 100   


 


12/12/18 21:00      Room Air  


 


12/12/18 20:00 98.1 57 16 133/87 (102) 99   


 


12/12/18 19:00 99.6       


 


12/12/18 17:02 99.6       


 


12/12/18 16:00 99.6 61 20 149/96 (113) 100   


 


12/12/18 12:00 99.5 64 20 149/97 (114)    


 


12/12/18 09:00      Room Air  

















Intake and Output  


 


 12/12/18 12/13/18





 19:00 07:00


 


Intake Total 1375 ml 1485.0 ml


 


Output Total 400 ml 1300 ml


 


Balance 975 ml 185.0 ml


 


  


 


Intake IV Total 1375 ml 1485.0 ml


 


Output Urine Total 400 ml 1300 ml


 


# Voids 4 4








Laboratory Tests


12/12/18 09:25: 


Urine Color Pale yellow, Urine Appearance Clear, Urine pH 8, Urine Specific 

Gravity 1.010, Urine Protein Negative, Urine Glucose (UA) Negative, Urine 

Ketones Negative, Urine Blood 1+H, Urine Nitrite Negative, Urine Bilirubin 

Negative, Urine Urobilinogen Normal, Urine Leukocyte Esterase Negative, Urine 

RBC 0-2H, Urine WBC 0, Urine Squamous Epithelial Cells Occasional, Urine 

Bacteria Occasional


12/13/18 06:55: 


Sodium Level 135L, Potassium Level 4.2, Chloride Level 100, Carbon Dioxide 

Level 27, Anion Gap 9, Blood Urea Nitrogen 12, Creatinine 0.9, Estimat 

Glomerular Filtration Rate > 60, Glucose Level 97, Calcium Level 8.8, Total 

Bilirubin 0.7, Aspartate Amino Transf (AST/SGOT) 30, Alanine Aminotransferase (

ALT/SGPT) 58, Alkaline Phosphatase 84, Total Protein 8.1, Albumin 3.6, Globulin 

4.5, Albumin/Globulin Ratio 0.8L, Lipase 332


Height (Feet):  5


Height (Inches):  6.00


Weight (Pounds):  143


Abdomen:  soft, tender











Heriberto Thapa MD Dec 13, 2018 08:56

## 2018-12-14 VITALS — SYSTOLIC BLOOD PRESSURE: 132 MMHG | DIASTOLIC BLOOD PRESSURE: 84 MMHG

## 2018-12-14 VITALS — DIASTOLIC BLOOD PRESSURE: 92 MMHG | SYSTOLIC BLOOD PRESSURE: 164 MMHG

## 2018-12-14 VITALS — DIASTOLIC BLOOD PRESSURE: 93 MMHG | SYSTOLIC BLOOD PRESSURE: 165 MMHG

## 2018-12-14 VITALS — SYSTOLIC BLOOD PRESSURE: 125 MMHG | DIASTOLIC BLOOD PRESSURE: 82 MMHG

## 2018-12-14 VITALS — SYSTOLIC BLOOD PRESSURE: 146 MMHG | DIASTOLIC BLOOD PRESSURE: 87 MMHG

## 2018-12-14 VITALS — SYSTOLIC BLOOD PRESSURE: 136 MMHG | DIASTOLIC BLOOD PRESSURE: 87 MMHG

## 2018-12-14 RX ADMIN — HYDROCODONE BITARTRATE AND ACETAMINOPHEN PRN TAB: 10; 325 TABLET ORAL at 10:55

## 2018-12-14 RX ADMIN — MORPHINE SULFATE PRN MG: 2 INJECTION, SOLUTION INTRAMUSCULAR; INTRAVENOUS at 02:49

## 2018-12-14 RX ADMIN — PANTOPRAZOLE SODIUM SCH MG: 40 INJECTION, POWDER, FOR SOLUTION INTRAVENOUS at 08:29

## 2018-12-14 RX ADMIN — HEPARIN SODIUM SCH UNITS: 5000 INJECTION INTRAVENOUS; SUBCUTANEOUS at 08:30

## 2018-12-14 RX ADMIN — HYDROCODONE BITARTRATE AND ACETAMINOPHEN PRN TAB: 10; 325 TABLET ORAL at 15:03

## 2018-12-14 RX ADMIN — MORPHINE SULFATE PRN MG: 2 INJECTION, SOLUTION INTRAMUSCULAR; INTRAVENOUS at 06:24

## 2018-12-14 RX ADMIN — HYDROCODONE BITARTRATE AND ACETAMINOPHEN PRN TAB: 10; 325 TABLET ORAL at 19:13

## 2018-12-14 RX ADMIN — HEPARIN SODIUM SCH UNITS: 5000 INJECTION INTRAVENOUS; SUBCUTANEOUS at 20:39

## 2018-12-14 NOTE — GENERAL PROGRESS NOTE
Assessment/Plan


Problem List:  


(1) Pancreatitis


ICD Codes:  K85.90 - Acute pancreatitis without necrosis or infection, 

unspecified


SNOMED:  68703337


Qualifiers:  


   Qualified Codes:  K85.20 - Alcohol induced acute pancreatitis without 

necrosis or infection


Status:  stable


Assessment/Plan


full liquid


dc iv morphine


ivf


follow up labs 


dc planning if labs ok





Subjective


ROS Limited/Unobtainable:  No


Constitutional:  Reports: malaise, weakness


HEENT:  Reports: no symptoms


Cardiovascular:  Reports: no symptoms


Respiratory:  Reports: no symptoms


Gastrointestinal/Abdominal:  Reports: abdominal pain


Genitourinary:  Reports: no symptoms


Neurologic/Psychiatric:  Reports: no symptoms


Endocrine:  Reports: no symptoms


Hematologic/Lymphatic:  Reports: no symptoms


Allergies:  


Coded Allergies:  


     No Known Allergies (Unverified , 11/3/18)


All Systems:  reviewed and negative except above


Subjective


tolerating clears. c/o pain but appears completely comfortable. Hr normal. BP 

normal.





Objective





Last 24 Hour Vital Signs








  Date Time  Temp Pulse Resp B/P (MAP) Pulse Ox O2 Delivery O2 Flow Rate FiO2


 


12/14/18 04:00 98.6 60 18 136/87 (103) 98   


 


12/14/18 00:00 98.6 59 18 146/87 (106) 98   


 


12/13/18 21:00      Room Air  


 


12/13/18 20:00 98.1 57 18 152/95 (114) 99   


 


12/13/18 16:26 98.1       


 


12/13/18 16:00 98.1 64 18 155/89 (111) 99   


 


12/13/18 12:00 98.1 63 18 150/101 (117) 99   


 


12/13/18 09:00      Room Air  


 


12/13/18 08:00 98.5 78 19 137/92 (107) 98   

















Intake and Output  


 


 12/13/18 12/14/18





 18:59 06:59


 


Intake Total 1855 ml 1110.0 ml


 


Output Total 400 ml 


 


Balance 1455 ml 1110.0 ml


 


  


 


Intake Oral 480 ml 


 


IV Total 1375 ml 1110.0 ml


 


Output Urine Total 400 ml 








Laboratory Tests


12/13/18 06:55: 


Sodium Level 135L, Potassium Level 4.2, Chloride Level 100, Carbon Dioxide 

Level 27, Anion Gap 9, Blood Urea Nitrogen 12, Creatinine 0.9, Estimat 

Glomerular Filtration Rate > 60, Glucose Level 97, Calcium Level 8.8, Total 

Bilirubin 0.7, Aspartate Amino Transf (AST/SGOT) 30, Alanine Aminotransferase (

ALT/SGPT) 58, Alkaline Phosphatase 84, Total Protein 8.1, Albumin 3.6, Globulin 

4.5, Albumin/Globulin Ratio 0.8L, Lipase 332


12/14/18 05:50: Lipase [Pending]


Height (Feet):  5


Height (Inches):  6.00


Weight (Pounds):  143


Objective


soft. minimally tender











Heriberto Thapa MD Dec 14, 2018 06:52

## 2018-12-15 VITALS — SYSTOLIC BLOOD PRESSURE: 150 MMHG | DIASTOLIC BLOOD PRESSURE: 95 MMHG

## 2018-12-15 VITALS — DIASTOLIC BLOOD PRESSURE: 89 MMHG | SYSTOLIC BLOOD PRESSURE: 130 MMHG

## 2018-12-15 VITALS — SYSTOLIC BLOOD PRESSURE: 122 MMHG | DIASTOLIC BLOOD PRESSURE: 77 MMHG

## 2018-12-15 VITALS — SYSTOLIC BLOOD PRESSURE: 123 MMHG | DIASTOLIC BLOOD PRESSURE: 87 MMHG

## 2018-12-15 VITALS — SYSTOLIC BLOOD PRESSURE: 147 MMHG | DIASTOLIC BLOOD PRESSURE: 90 MMHG

## 2018-12-15 VITALS — DIASTOLIC BLOOD PRESSURE: 86 MMHG | SYSTOLIC BLOOD PRESSURE: 133 MMHG

## 2018-12-15 RX ADMIN — DOCUSATE SODIUM SCH MG: 250 CAPSULE, LIQUID FILLED ORAL at 17:30

## 2018-12-15 RX ADMIN — HEPARIN SODIUM SCH UNITS: 5000 INJECTION INTRAVENOUS; SUBCUTANEOUS at 20:17

## 2018-12-15 RX ADMIN — HYDROCODONE BITARTRATE AND ACETAMINOPHEN PRN TAB: 10; 325 TABLET ORAL at 14:47

## 2018-12-15 RX ADMIN — HEPARIN SODIUM SCH UNITS: 5000 INJECTION INTRAVENOUS; SUBCUTANEOUS at 08:10

## 2018-12-15 RX ADMIN — DOCUSATE SODIUM SCH MG: 250 CAPSULE, LIQUID FILLED ORAL at 09:37

## 2018-12-15 RX ADMIN — HYDROCODONE BITARTRATE AND ACETAMINOPHEN PRN TAB: 10; 325 TABLET ORAL at 20:16

## 2018-12-15 RX ADMIN — HYDROCODONE BITARTRATE AND ACETAMINOPHEN PRN TAB: 10; 325 TABLET ORAL at 09:38

## 2018-12-15 RX ADMIN — PANTOPRAZOLE SODIUM SCH MG: 40 INJECTION, POWDER, FOR SOLUTION INTRAVENOUS at 08:07

## 2018-12-15 RX ADMIN — HYDROCODONE BITARTRATE AND ACETAMINOPHEN PRN TAB: 10; 325 TABLET ORAL at 04:15

## 2018-12-15 NOTE — GENERAL PROGRESS NOTE
Assessment/Plan


Problem List:  


(1) Pancreatitis


ICD Codes:  K85.90 - Acute pancreatitis without necrosis or infection, 

unspecified


SNOMED:  64521581


Qualifiers:  


   Qualified Codes:  K85.20 - Alcohol induced acute pancreatitis without 

necrosis or infection


Assessment/Plan


full liquid


dc iv morphine


ivf


follow up labs 


dc planning if labs ok. not ready yet





Subjective


ROS Limited/Unobtainable:  No


Constitutional:  Reports: malaise, weakness


HEENT:  Reports: no symptoms


Cardiovascular:  Reports: no symptoms


Respiratory:  Reports: no symptoms


Gastrointestinal/Abdominal:  Reports: no symptoms


Genitourinary:  Reports: no symptoms


Neurologic/Psychiatric:  Reports: no symptoms


Endocrine:  Reports: no symptoms


Hematologic/Lymphatic:  Reports: no symptoms


Allergies:  


Coded Allergies:  


     No Known Allergies (Unverified , 11/3/18)


All Systems:  reviewed and negative except above


Subjective


tolerating clears. c/o pain but appears completely comfortable. Hr normal. BP 

normal. 


pain slightly worse when eating. constipated.





Objective





Last 24 Hour Vital Signs








  Date Time  Temp Pulse Resp B/P (MAP) Pulse Ox O2 Delivery O2 Flow Rate FiO2


 


12/15/18 04:00 97.0 53 18 130/89 (103) 100   


 


12/15/18 00:00 97.5 53 18 122/77 (92) 98   


 


12/14/18 21:00      Room Air  


 


12/14/18 20:00 97.4 60 18 125/82 (96) 99   


 


12/14/18 16:00 97.8 55 18 132/84 (100) 98   


 


12/14/18 12:00 99.0 60 18 165/93 (117) 99   


 


12/14/18 10:55    164/92    


 


12/14/18 09:00      Room Air  

















Intake and Output  


 


 12/14/18 12/15/18





 18:59 06:59


 


Intake Total 1845 ml 1600 ml


 


Output Total 1300 ml 1650 ml


 


Balance 545 ml -50 ml


 


  


 


Intake Oral 720 ml 350 ml


 


IV Total 1125 ml 1250 ml


 


Output Urine Total 1300 ml 1650 ml








Laboratory Tests


12/15/18 05:35: Lipase 544H


Height (Feet):  5


Height (Inches):  6.00


Weight (Pounds):  143


Objective


soft. minimally tender











Heriberto Thapa MD Dec 15, 2018 08:38

## 2018-12-16 VITALS — SYSTOLIC BLOOD PRESSURE: 142 MMHG | DIASTOLIC BLOOD PRESSURE: 63 MMHG

## 2018-12-16 VITALS — SYSTOLIC BLOOD PRESSURE: 137 MMHG | DIASTOLIC BLOOD PRESSURE: 84 MMHG

## 2018-12-16 VITALS — SYSTOLIC BLOOD PRESSURE: 117 MMHG | DIASTOLIC BLOOD PRESSURE: 58 MMHG

## 2018-12-16 VITALS — SYSTOLIC BLOOD PRESSURE: 130 MMHG | DIASTOLIC BLOOD PRESSURE: 85 MMHG

## 2018-12-16 VITALS — DIASTOLIC BLOOD PRESSURE: 84 MMHG | SYSTOLIC BLOOD PRESSURE: 140 MMHG

## 2018-12-16 VITALS — DIASTOLIC BLOOD PRESSURE: 73 MMHG | SYSTOLIC BLOOD PRESSURE: 148 MMHG

## 2018-12-16 VITALS — DIASTOLIC BLOOD PRESSURE: 87 MMHG | SYSTOLIC BLOOD PRESSURE: 158 MMHG

## 2018-12-16 LAB
ADD MANUAL DIFF: NO
ALBUMIN SERPL-MCNC: 3.3 G/DL (ref 3.4–5)
ALBUMIN/GLOB SERPL: 0.7 {RATIO} (ref 1–2.7)
ALP SERPL-CCNC: 108 U/L (ref 46–116)
ALT SERPL-CCNC: 108 U/L (ref 12–78)
ANION GAP SERPL CALC-SCNC: 11 MMOL/L (ref 5–15)
AST SERPL-CCNC: 48 U/L (ref 15–37)
BASOPHILS NFR BLD AUTO: 0.6 % (ref 0–2)
BILIRUB SERPL-MCNC: 0.3 MG/DL (ref 0.2–1)
BUN SERPL-MCNC: 14 MG/DL (ref 7–18)
CALCIUM SERPL-MCNC: 8.8 MG/DL (ref 8.5–10.1)
CHLORIDE SERPL-SCNC: 104 MMOL/L (ref 98–107)
CO2 SERPL-SCNC: 25 MMOL/L (ref 21–32)
CREAT SERPL-MCNC: 1 MG/DL (ref 0.55–1.3)
EOSINOPHIL NFR BLD AUTO: 2.7 % (ref 0–3)
ERYTHROCYTE [DISTWIDTH] IN BLOOD BY AUTOMATED COUNT: 12.9 % (ref 11.6–14.8)
GLOBULIN SER-MCNC: 4.5 G/DL
HCT VFR BLD CALC: 39.3 % (ref 42–52)
HGB BLD-MCNC: 13.7 G/DL (ref 14.2–18)
LYMPHOCYTES NFR BLD AUTO: 23.5 % (ref 20–45)
MCV RBC AUTO: 85 FL (ref 80–99)
MONOCYTES NFR BLD AUTO: 5 % (ref 1–10)
NEUTROPHILS NFR BLD AUTO: 68.2 % (ref 45–75)
PLATELET # BLD: 281 K/UL (ref 150–450)
POTASSIUM SERPL-SCNC: 4.4 MMOL/L (ref 3.5–5.1)
RBC # BLD AUTO: 4.6 M/UL (ref 4.7–6.1)
SODIUM SERPL-SCNC: 140 MMOL/L (ref 136–145)
WBC # BLD AUTO: 7.1 K/UL (ref 4.8–10.8)

## 2018-12-16 RX ADMIN — HEPARIN SODIUM SCH UNITS: 5000 INJECTION INTRAVENOUS; SUBCUTANEOUS at 08:22

## 2018-12-16 RX ADMIN — HEPARIN SODIUM SCH UNITS: 5000 INJECTION INTRAVENOUS; SUBCUTANEOUS at 20:15

## 2018-12-16 RX ADMIN — DOCUSATE SODIUM SCH MG: 250 CAPSULE, LIQUID FILLED ORAL at 17:51

## 2018-12-16 RX ADMIN — HYDROCODONE BITARTRATE AND ACETAMINOPHEN PRN TAB: 10; 325 TABLET ORAL at 05:37

## 2018-12-16 RX ADMIN — HYDROCODONE BITARTRATE AND ACETAMINOPHEN PRN TAB: 10; 325 TABLET ORAL at 00:33

## 2018-12-16 RX ADMIN — HYDROCODONE BITARTRATE AND ACETAMINOPHEN PRN TAB: 10; 325 TABLET ORAL at 10:15

## 2018-12-16 RX ADMIN — PANTOPRAZOLE SODIUM SCH MG: 40 INJECTION, POWDER, FOR SOLUTION INTRAVENOUS at 08:20

## 2018-12-16 RX ADMIN — HYDROCODONE BITARTRATE AND ACETAMINOPHEN PRN TAB: 10; 325 TABLET ORAL at 20:11

## 2018-12-16 RX ADMIN — HYDROCODONE BITARTRATE AND ACETAMINOPHEN PRN TAB: 10; 325 TABLET ORAL at 15:09

## 2018-12-16 RX ADMIN — DOCUSATE SODIUM SCH MG: 250 CAPSULE, LIQUID FILLED ORAL at 08:20

## 2018-12-16 NOTE — GENERAL PROGRESS NOTE
Assessment/Plan


Problem List:  


(1) Pancreatitis


ICD Codes:  K85.90 - Acute pancreatitis without necrosis or infection, 

unspecified


SNOMED:  89674013


Qualifiers:  


   Qualified Codes:  K85.20 - Alcohol induced acute pancreatitis without 

necrosis or infection


Status:  stable


Assessment/Plan


soft diet


dc iv morphine


ivf


follow up labs 


dc planning if labs ok. not ready yet





Subjective


ROS Limited/Unobtainable:  No


Constitutional:  Reports: weakness


HEENT:  Reports: no symptoms


Cardiovascular:  Reports: no symptoms


Respiratory:  Reports: no symptoms


Gastrointestinal/Abdominal:  Reports: no symptoms


Genitourinary:  Reports: no symptoms


Neurologic/Psychiatric:  Reports: no symptoms


Endocrine:  Reports: no symptoms


Hematologic/Lymphatic:  Reports: no symptoms


Allergies:  


Coded Allergies:  


     No Known Allergies (Unverified , 11/3/18)


All Systems:  reviewed and negative except above


Subjective


pain better, slept well. has some flank pain. lipase stable. clinically 

improving. no more abd pain





Objective





Last 24 Hour Vital Signs








  Date Time  Temp Pulse Resp B/P (MAP) Pulse Ox O2 Delivery O2 Flow Rate FiO2


 


12/16/18 09:54 99.5 75 18 137/84 (101) 100   


 


12/16/18 09:00      Room Air  


 


12/16/18 04:00 97.8 59 16 117/58 (77) 98   


 


12/16/18 00:00 97.7 58 16 142/63 (89) 98   


 


12/15/18 20:00 97.7 63 17 150/95 (113) 99   


 


12/15/18 19:43      Room Air  


 


12/15/18 16:00 98.9 61 20 147/90 (109) 98   


 


12/15/18 12:00 99.2 57 20 123/87 (99) 100   

















Intake and Output  


 


 12/15/18 12/16/18





 19:00 07:00


 


Intake Total 1865 ml 1250 ml


 


Output Total 700 ml 450 ml


 


Balance 1165 ml 800 ml


 


  


 


Intake Oral 1240 ml 


 


IV Total 625 ml 1250 ml


 


Output Urine Total 700 ml 450 ml


 


# Voids 7 3








Laboratory Tests


12/16/18 05:35: 


White Blood Count 7.1, Red Blood Count 4.60L, Hemoglobin 13.7L, Hematocrit 39.3L

, Mean Corpuscular Volume 85, Mean Corpuscular Hemoglobin 29.7, Mean 

Corpuscular Hemoglobin Concent 34.8, Red Cell Distribution Width 12.9, Platelet 

Count 281, Mean Platelet Volume 5.6L, Neutrophils (%) (Auto) 68.2, Lymphocytes (

%) (Auto) 23.5, Monocytes (%) (Auto) 5.0, Eosinophils (%) (Auto) 2.7, Basophils 

(%) (Auto) 0.6, Sodium Level 140, Potassium Level 4.4, Chloride Level 104, 

Carbon Dioxide Level 25, Anion Gap 11, Blood Urea Nitrogen 14, Creatinine 1.0, 

Estimat Glomerular Filtration Rate > 60, Glucose Level 137H, Calcium Level 8.8, 

Total Bilirubin 0.3, Aspartate Amino Transf (AST/SGOT) 48H, Alanine 

Aminotransferase (ALT/SGPT) 108H, Alkaline Phosphatase 108, Total Protein 7.8, 

Albumin 3.3L, Globulin 4.5, Albumin/Globulin Ratio 0.7L, Lipase 585H


Height (Feet):  5


Height (Inches):  6.00


Weight (Pounds):  143


Objective


soft. minimally tender











Heriberto Thapa MD Dec 16, 2018 10:09

## 2018-12-17 VITALS — SYSTOLIC BLOOD PRESSURE: 141 MMHG | DIASTOLIC BLOOD PRESSURE: 92 MMHG

## 2018-12-17 VITALS — DIASTOLIC BLOOD PRESSURE: 83 MMHG | SYSTOLIC BLOOD PRESSURE: 151 MMHG

## 2018-12-17 VITALS — DIASTOLIC BLOOD PRESSURE: 93 MMHG | SYSTOLIC BLOOD PRESSURE: 145 MMHG

## 2018-12-17 VITALS — DIASTOLIC BLOOD PRESSURE: 96 MMHG | SYSTOLIC BLOOD PRESSURE: 149 MMHG

## 2018-12-17 LAB
ALBUMIN SERPL-MCNC: 3.6 G/DL (ref 3.4–5)
ALBUMIN/GLOB SERPL: 0.7 {RATIO} (ref 1–2.7)
ALP SERPL-CCNC: 99 U/L (ref 46–116)
ALT SERPL-CCNC: 98 U/L (ref 12–78)
ANION GAP SERPL CALC-SCNC: 10 MMOL/L (ref 5–15)
AST SERPL-CCNC: 32 U/L (ref 15–37)
BILIRUB SERPL-MCNC: 0.4 MG/DL (ref 0.2–1)
BUN SERPL-MCNC: 6 MG/DL (ref 7–18)
CALCIUM SERPL-MCNC: 9.5 MG/DL (ref 8.5–10.1)
CHLORIDE SERPL-SCNC: 102 MMOL/L (ref 98–107)
CO2 SERPL-SCNC: 26 MMOL/L (ref 21–32)
CREAT SERPL-MCNC: 0.9 MG/DL (ref 0.55–1.3)
GLOBULIN SER-MCNC: 5 G/DL
POTASSIUM SERPL-SCNC: 4.2 MMOL/L (ref 3.5–5.1)
SODIUM SERPL-SCNC: 138 MMOL/L (ref 136–145)

## 2018-12-17 RX ADMIN — HYDROCODONE BITARTRATE AND ACETAMINOPHEN PRN TAB: 10; 325 TABLET ORAL at 07:49

## 2018-12-17 RX ADMIN — HEPARIN SODIUM SCH UNITS: 5000 INJECTION INTRAVENOUS; SUBCUTANEOUS at 08:46

## 2018-12-17 RX ADMIN — DOCUSATE SODIUM SCH MG: 250 CAPSULE, LIQUID FILLED ORAL at 08:42

## 2018-12-17 RX ADMIN — PANTOPRAZOLE SODIUM SCH MG: 40 INJECTION, POWDER, FOR SOLUTION INTRAVENOUS at 08:42

## 2018-12-17 NOTE — DISCHARGE SUMMARY
DATE OF ADMISSION:  12/10/2018



DATE OF DISCHARGE:  12/17/2018



ADMISSION DIAGNOSES:

1. Acute pancreatitis.

2. Alcohol abuse.



DISCHARGE DIAGNOSES:

1. Acute pancreatitis.

2. Alcohol abuse.



HOSPITAL COURSE:  The patient is an unfortunate 28-year-old male with a

history of alcohol-related pancreatitis, presented with complaints of

seven days of drinking and abdominal pain.  He had an elevated lipase.  He

was initially kept NPO.  He received IV pain medications as needed.  Pain

improved.  Upon discharge, he was pain free.  He did have some persistent

mild elevation of _____ lipase.  This was felt to be possibly related to

chronic pancreatitis.  He was asymptomatic _____ now pain-free.  We

recommended that he follow up with his PMD to have repeat testing and

possibly referred to a GI doctor.  Alcohol cessation has been discussed at

length with the patient.  On discharge, he was doing well.



DISCHARGE MEDICATIONS:  Please see discharge list for discharge

medications.



DIET:  Soft diet.



ACTIVITIES:  Ad beatriz.



FOLLOWUP:  The patient has been asked to follow up with PMD in one week.









  ______________________________________________

  Heriberto Thapa M.D. DR:  TYLER

D:  12/17/2018 08:20

T:  12/17/2018 08:28

JOB#:  [id number]

CC:

## 2019-01-01 ENCOUNTER — HOSPITAL ENCOUNTER (EMERGENCY)
Dept: HOSPITAL 72 - EMR | Age: 29
Discharge: HOME | End: 2019-01-01
Payer: MEDICAID

## 2019-01-01 VITALS — SYSTOLIC BLOOD PRESSURE: 160 MMHG | DIASTOLIC BLOOD PRESSURE: 114 MMHG

## 2019-01-01 VITALS — HEIGHT: 64 IN | BODY MASS INDEX: 23.05 KG/M2 | WEIGHT: 135 LBS

## 2019-01-01 VITALS — DIASTOLIC BLOOD PRESSURE: 97 MMHG | SYSTOLIC BLOOD PRESSURE: 133 MMHG

## 2019-01-01 DIAGNOSIS — K85.90: Primary | ICD-10-CM

## 2019-01-01 LAB
ADD MANUAL DIFF: NO
ALBUMIN SERPL-MCNC: 4.3 G/DL (ref 3.4–5)
ALBUMIN/GLOB SERPL: 1 {RATIO} (ref 1–2.7)
ALP SERPL-CCNC: 88 U/L (ref 46–116)
ALT SERPL-CCNC: 78 U/L (ref 12–78)
ANION GAP SERPL CALC-SCNC: 12 MMOL/L (ref 5–15)
AST SERPL-CCNC: 32 U/L (ref 15–37)
BASOPHILS NFR BLD AUTO: 1.1 % (ref 0–2)
BILIRUB SERPL-MCNC: 0.3 MG/DL (ref 0.2–1)
BUN SERPL-MCNC: 11 MG/DL (ref 7–18)
CALCIUM SERPL-MCNC: 9.3 MG/DL (ref 8.5–10.1)
CHLORIDE SERPL-SCNC: 100 MMOL/L (ref 98–107)
CO2 SERPL-SCNC: 26 MMOL/L (ref 21–32)
CREAT SERPL-MCNC: 0.8 MG/DL (ref 0.55–1.3)
EOSINOPHIL NFR BLD AUTO: 1.1 % (ref 0–3)
ERYTHROCYTE [DISTWIDTH] IN BLOOD BY AUTOMATED COUNT: 12.6 % (ref 11.6–14.8)
GLOBULIN SER-MCNC: 4.1 G/DL
HCT VFR BLD CALC: 44 % (ref 42–52)
HGB BLD-MCNC: 15.2 G/DL (ref 14.2–18)
LYMPHOCYTES NFR BLD AUTO: 44 % (ref 20–45)
MCV RBC AUTO: 89 FL (ref 80–99)
MONOCYTES NFR BLD AUTO: 7.4 % (ref 1–10)
NEUTROPHILS NFR BLD AUTO: 46.5 % (ref 45–75)
PLATELET # BLD: 482 K/UL (ref 150–450)
POTASSIUM SERPL-SCNC: 3.8 MMOL/L (ref 3.5–5.1)
RBC # BLD AUTO: 4.96 M/UL (ref 4.7–6.1)
SODIUM SERPL-SCNC: 138 MMOL/L (ref 136–145)
WBC # BLD AUTO: 8.9 K/UL (ref 4.8–10.8)

## 2019-01-01 PROCEDURE — 80053 COMPREHEN METABOLIC PANEL: CPT

## 2019-01-01 PROCEDURE — 36415 COLL VENOUS BLD VENIPUNCTURE: CPT

## 2019-01-01 PROCEDURE — 80329 ANALGESICS NON-OPIOID 1 OR 2: CPT

## 2019-01-01 PROCEDURE — 99284 EMERGENCY DEPT VISIT MOD MDM: CPT

## 2019-01-01 PROCEDURE — 85025 COMPLETE CBC W/AUTO DIFF WBC: CPT

## 2019-01-01 PROCEDURE — 96374 THER/PROPH/DIAG INJ IV PUSH: CPT

## 2019-01-01 PROCEDURE — 96375 TX/PRO/DX INJ NEW DRUG ADDON: CPT

## 2019-01-01 PROCEDURE — 83690 ASSAY OF LIPASE: CPT

## 2019-01-01 PROCEDURE — 96361 HYDRATE IV INFUSION ADD-ON: CPT

## 2019-01-01 NOTE — EMERGENCY ROOM REPORT
History of Present Illness


General


Chief Complaint:  Abdominal Pain


Source:  Patient





Present Illness


HPI


Patient presents with complaints of diffuse abdominal pain sensation of 

swelling pain is 6 out of 10 patient has increased nausea vomiting denies any 

chest pain denies any fevers patient reports that he drank one beer yesterday


Pain however started 2 days ago





Patient had recent hospitalization for pancreatitis


Denies any recent travel denies any rash denies any dysuria frequency


Allergies:  


Coded Allergies:  


     No Known Allergies (Unverified , 11/3/18)





Patient History


Past Medical History:  see triage record


Pertinent Family History:  none


Reviewed Nursing Documentation:  PMH: Agreed; PSxH: Agreed





Nursing Documentation-PM


Past Medical History:  No History, Except For


Hx Cardiac Problems:  No


Hx Gastrointestinal Problems:  Yes - pancreatitis


Hx Neurological Problems:  No





Review of Systems


All Other Systems:  negative except mentioned in HPI





Physical Exam





Vital Signs








  Date Time  Temp Pulse Resp B/P (MAP) Pulse Ox O2 Delivery O2 Flow Rate FiO2


 


1/1/19 14:48 97.9 98 19 147/71 97 Room Air  








Sp02 EP Interpretation:  reviewed, normal


General Appearance:  mild distress - Appears uncomfortable


Head:  normocephalic, atraumatic


Eyes:  bilateral eye PERRL, bilateral eye EOMI


ENT:  hearing grossly normal, normal pharynx, TMs + canals normal, uvula midline


Neck:  full range of motion, supple, no meningismus, no bony tend


Respiratory:  lungs clear, normal breath sounds, no rhonchi, no respiratory 

distress, no retraction, no accessory muscle use


Cardiovascular #1:  normal peripheral pulses, regular rate, rhythm, no edema, 

no gallop, no JVD, no murmur


Gastrointestinal:  normal bowel sounds, non tender, soft, no mass, no 

organomegaly, non-distended, no guarding, no hernia, no pulsatile mass, no 

rebound


Genitourinary:  no CVA tenderness


Musculoskeletal:  normal inspection


Neurologic:  oriented x3, responsive, CNs III-XII nml as tested, motor strength/

tone normal, sensory intact


Psychiatric:  mood/affect normal


Skin:  normal color, no rash, warm/dry, palpation normal


Lymphatic:  normal inspection, no adenopathy





Medical Decision Making


Diagnostic Impression:  


 Primary Impression:  


 Pancreatitis


ER Course


With the history exam and presentation, multiple differentials considered, 

including but not limited to appendicitis, gastritis, cholecystitis, 

diverticulitis





Patient's lipase level is improved from recent discharge


Patient continues to feel significantly improved


Patient requires altered diet and will return with any changes otherwise stable 

for close outpatient follow-up





Labs








Test


  1/1/19


15:10


 


White Blood Count


  8.9 K/UL


(4.8-10.8)


 


Red Blood Count


  4.96 M/UL


(4.70-6.10)


 


Hemoglobin


  15.2 G/DL


(14.2-18.0)


 


Hematocrit


  44.0 %


(42.0-52.0)


 


Mean Corpuscular Volume 89 FL (80-99) 


 


Mean Corpuscular Hemoglobin


  30.6 PG


(27.0-31.0)


 


Mean Corpuscular Hemoglobin


Concent 34.4 G/DL


(32.0-36.0)


 


Red Cell Distribution Width


  12.6 %


(11.6-14.8)


 


Platelet Count


  482 K/UL


(150-450)


 


Mean Platelet Volume


  5.7 FL


(6.5-10.1)


 


Neutrophils (%) (Auto)


  46.5 %


(45.0-75.0)


 


Lymphocytes (%) (Auto)


  44.0 %


(20.0-45.0)


 


Monocytes (%) (Auto)


  7.4 %


(1.0-10.0)


 


Eosinophils (%) (Auto)


  1.1 %


(0.0-3.0)


 


Basophils (%) (Auto)


  1.1 %


(0.0-2.0)


 


Sodium Level


  138 MMOL/L


(136-145)


 


Potassium Level


  3.8 MMOL/L


(3.5-5.1)


 


Chloride Level


  100 MMOL/L


()


 


Carbon Dioxide Level


  26 MMOL/L


(21-32)


 


Anion Gap


  12 mmol/L


(5-15)


 


Blood Urea Nitrogen


  11 mg/dL


(7-18)


 


Creatinine


  0.8 MG/DL


(0.55-1.30)


 


Estimat Glomerular Filtration


Rate > 60 mL/min


(>60)


 


Glucose Level


  115 MG/DL


()


 


Calcium Level


  9.3 MG/DL


(8.5-10.1)


 


Total Bilirubin


  0.3 MG/DL


(0.2-1.0)


 


Aspartate Amino Transf


(AST/SGOT) 32 U/L (15-37) 


 


 


Alanine Aminotransferase


(ALT/SGPT) 78 U/L (12-78) 


 


 


Alkaline Phosphatase


  88 U/L


()


 


Total Protein


  8.4 G/DL


(6.4-8.2)


 


Albumin


  4.3 G/DL


(3.4-5.0)


 


Globulin 4.1 g/dL 


 


Albumin/Globulin Ratio 1.0 (1.0-2.7) 


 


Lipase


  408 U/L


()


 


Serum Alcohol 95 mg/dL 











Last Vital Signs








  Date Time  Temp Pulse Resp B/P (MAP) Pulse Ox O2 Delivery O2 Flow Rate FiO2


 


1/1/19 14:48 97.9 98 19 147/71 97 Room Air  








Status:  improved


Disposition:  HOME, SELF-CARE


Condition:  Improved


Scripts


Famotidine (PEPCID AC) 20 Mg Tablet


20 MG PO DAILY, #12 TAB


   Prov: Barbara Monsalve DO         1/1/19





Additional Instructions:  


Patient is provided with the discharge instructions notified to follow up with 

primary doctor in the next 2-3 days otherwise return to the er with any 

worsening symptoms.


Please note that this report is being documented using DRAGON technology.  This 

can lead to erroneous entry secondary to incorrect interpretation by the 

dictating instrument.











Barbara Monsalve DO Jan 1, 2019 15:11

## 2019-01-06 ENCOUNTER — HOSPITAL ENCOUNTER (EMERGENCY)
Dept: HOSPITAL 72 - EMR | Age: 29
Discharge: HOME | End: 2019-01-06
Payer: MEDICAID

## 2019-01-06 VITALS — DIASTOLIC BLOOD PRESSURE: 67 MMHG | SYSTOLIC BLOOD PRESSURE: 142 MMHG

## 2019-01-06 VITALS — BODY MASS INDEX: 21.19 KG/M2 | WEIGHT: 135 LBS | HEIGHT: 67 IN

## 2019-01-06 VITALS — SYSTOLIC BLOOD PRESSURE: 137 MMHG | DIASTOLIC BLOOD PRESSURE: 103 MMHG

## 2019-01-06 VITALS — DIASTOLIC BLOOD PRESSURE: 103 MMHG | SYSTOLIC BLOOD PRESSURE: 137 MMHG

## 2019-01-06 DIAGNOSIS — R10.84: Primary | ICD-10-CM

## 2019-01-06 DIAGNOSIS — B34.9: ICD-10-CM

## 2019-01-06 LAB
ADD MANUAL DIFF: NO
ALBUMIN SERPL-MCNC: 4.1 G/DL (ref 3.4–5)
ALBUMIN/GLOB SERPL: 1 {RATIO} (ref 1–2.7)
ALP SERPL-CCNC: 84 U/L (ref 46–116)
ALT SERPL-CCNC: 90 U/L (ref 12–78)
ANION GAP SERPL CALC-SCNC: 16 MMOL/L (ref 5–15)
AST SERPL-CCNC: 53 U/L (ref 15–37)
BASOPHILS NFR BLD AUTO: 1.2 % (ref 0–2)
BILIRUB SERPL-MCNC: 0.6 MG/DL (ref 0.2–1)
BUN SERPL-MCNC: 15 MG/DL (ref 7–18)
CALCIUM SERPL-MCNC: 9.1 MG/DL (ref 8.5–10.1)
CHLORIDE SERPL-SCNC: 98 MMOL/L (ref 98–107)
CO2 SERPL-SCNC: 24 MMOL/L (ref 21–32)
CREAT SERPL-MCNC: 1 MG/DL (ref 0.55–1.3)
EOSINOPHIL NFR BLD AUTO: 1.3 % (ref 0–3)
ERYTHROCYTE [DISTWIDTH] IN BLOOD BY AUTOMATED COUNT: 12.3 % (ref 11.6–14.8)
GLOBULIN SER-MCNC: 4.2 G/DL
HCT VFR BLD CALC: 45 % (ref 42–52)
HGB BLD-MCNC: 15.5 G/DL (ref 14.2–18)
LYMPHOCYTES NFR BLD AUTO: 54.1 % (ref 20–45)
MCV RBC AUTO: 88 FL (ref 80–99)
MONOCYTES NFR BLD AUTO: 8.5 % (ref 1–10)
NEUTROPHILS NFR BLD AUTO: 35 % (ref 45–75)
PLATELET # BLD: 358 K/UL (ref 150–450)
POTASSIUM SERPL-SCNC: 3.5 MMOL/L (ref 3.5–5.1)
RBC # BLD AUTO: 5.12 M/UL (ref 4.7–6.1)
SODIUM SERPL-SCNC: 137 MMOL/L (ref 136–145)
WBC # BLD AUTO: 7.1 K/UL (ref 4.8–10.8)

## 2019-01-06 PROCEDURE — 80053 COMPREHEN METABOLIC PANEL: CPT

## 2019-01-06 PROCEDURE — 83690 ASSAY OF LIPASE: CPT

## 2019-01-06 PROCEDURE — 96375 TX/PRO/DX INJ NEW DRUG ADDON: CPT

## 2019-01-06 PROCEDURE — 99284 EMERGENCY DEPT VISIT MOD MDM: CPT

## 2019-01-06 PROCEDURE — 96374 THER/PROPH/DIAG INJ IV PUSH: CPT

## 2019-01-06 PROCEDURE — 36415 COLL VENOUS BLD VENIPUNCTURE: CPT

## 2019-01-06 PROCEDURE — 85025 COMPLETE CBC W/AUTO DIFF WBC: CPT

## 2019-01-06 NOTE — NUR
ED Nurse Note:



Lab drawn and sent to lab, pt could not produce urine at this time, will try 
again.

## 2019-01-06 NOTE — EMERGENCY ROOM REPORT
History of Present Illness


General


Chief Complaint:  Abdominal Pain


Source:  Patient





Present Illness


HPI


Patient presents with 2 days of nausea vomiting and abdominal pain.  The 

abdominal pain is intermittent.  He feels it bilaterally in the lower abdomen 

and flanks.  He denies any fevers or chills.  He's unable to keep down liquids 

at this time.  He denies any diarrhea, melena or hematochezia.  There is no 

vomiting of blood or coffee grounds.  He has taken omeprazole in the past but 

doesn't have a diagnosis.  In addition to that he's tried Tylenol.  This hasn't 

helped much with the pain.  He rates the pain 9/10 cramping and pressure and 

aching.  He denies dysuria.  The vomiting and has irritated his throat.  He 

also has a mild cough.  He did not receive a flu vaccination this year.  He 

said nonproductive cough recently also.





Of significance the patient was admitted in December and also January 1 for 

pancreatitis here at this hospital.  Patient denies drinking alcohol.


Allergies:  


Coded Allergies:  


     No Known Allergies (Unverified , 11/3/18)





Patient History


Past Medical History:  see triage record


Social History:  Denies: smoking, alcohol use, drug use


Social History Narrative


works 7/[employer]


Reviewed Nursing Documentation:  PMH: Agreed; PSxH: Agreed





Nursing Documentation-PMH


Past Medical History:  No History, Except For


Hx Cardiac Problems:  No


Hx Gastrointestinal Problems:  Yes - pancreatitis


Hx Neurological Problems:  No





Review of Systems


All Other Systems:  negative except mentioned in HPI





Physical Exam





Vital Signs








  Date Time  Temp Pulse Resp B/P (MAP) Pulse Ox O2 Delivery O2 Flow Rate FiO2


 


1/6/19 15:53 98.8 106 18 183/91 96 Room Air  








Sp02 EP Interpretation:  reviewed, normal


General Appearance:  well appearing, no apparent distress, GCS 15


Head:  normocephalic


Eyes:  bilateral eye normal inspection, bilateral eye PERRL


ENT:  normal pharynx, moist mucus membranes


Neck:  supple


Respiratory:  lungs clear, normal breath sounds


Cardiovascular #1:  regular rate, rhythm


Cardiovascular #2:  2+ radial (R)


Gastrointestinal:  normal inspection, normal bowel sounds, soft, no mass, non-

distended, no guarding, no rebound, tenderness - .  Abdomen


Genitourinary:  no CVA tenderness


Musculoskeletal:  back normal, gait/station normal, normal range of motion


Neurologic:  alert, oriented x3, grossly normal


Psychiatric:  mood/affect normal


Skin:  normal inspection, warm/dry





Medical Decision Making


Diagnostic Impression:  


 Primary Impression:  


 Abdominal pain


 Qualified Codes:  R10.84 - Generalized abdominal pain


 Additional Impression:  


 Viral syndrome


ER Course


Patient presents with abdominal pain and vomiting.  Differential includes 

gastroenteritis, gastritis, GERD, pancreatitis, UTI amongst others.  History of 

pancreatitis in the past.  Evaluation will be with labs.  The patient will be 

treated with IV hydration, Zofran, Pepcid and morphine.  Based on his exam no 

imaging is indicated at the moment.





CBC normal.  Lipase normal.  CMP normal.





Patient's pain is improved.





Patient is stable for outpatient observation and treatment.  I discussed the 

need to find a doctor for follow-up.  Symptomatic treatment for the upper 

respiratory infection was discussed with the patient.





Laboratory Tests








Test


  1/6/19


16:18


 


White Blood Count


  7.1 K/UL


(4.8-10.8)


 


Red Blood Count


  5.12 M/UL


(4.70-6.10)


 


Hemoglobin


  15.5 G/DL


(14.2-18.0)


 


Hematocrit


  45.0 %


(42.0-52.0)


 


Mean Corpuscular Volume 88 FL (80-99)  


 


Mean Corpuscular Hemoglobin


  30.3 PG


(27.0-31.0)


 


Mean Corpuscular Hemoglobin


Concent 34.5 G/DL


(32.0-36.0)


 


Red Cell Distribution Width


  12.3 %


(11.6-14.8)


 


Platelet Count


  358 K/UL


(150-450)


 


Mean Platelet Volume


  5.9 FL


(6.5-10.1)  L


 


Neutrophils (%) (Auto)


  35.0 %


(45.0-75.0)  L


 


Lymphocytes (%) (Auto)


  54.1 %


(20.0-45.0)  H


 


Monocytes (%) (Auto)


  8.5 %


(1.0-10.0)


 


Eosinophils (%) (Auto)


  1.3 %


(0.0-3.0)


 


Basophils (%) (Auto)


  1.2 %


(0.0-2.0)


 


Sodium Level


  137 MMOL/L


(136-145)


 


Potassium Level


  3.5 MMOL/L


(3.5-5.1)


 


Chloride Level


  98 MMOL/L


()


 


Carbon Dioxide Level


  24 MMOL/L


(21-32)


 


Anion Gap


  16 mmol/L


(5-15)  H


 


Blood Urea Nitrogen


  15 mg/dL


(7-18)


 


Creatinine


  1.0 MG/DL


(0.55-1.30)


 


Estimate Glomerular


Filtration Rate > 60 mL/min


(>60)


 


Glucose Level


  144 MG/DL


()  H


 


Calcium Level


  9.1 MG/DL


(8.5-10.1)


 


Total Bilirubin


  0.6 MG/DL


(0.2-1.0)


 


Aspartate Amino Transferase


(AST) 53 U/L (15-37)


H


 


Alanine Aminotransferase (ALT)


  90 U/L (12-78)


H


 


Alkaline Phosphatase


  84 U/L


()


 


Total Protein


  8.3 G/DL


(6.4-8.2)  H


 


Albumin


  4.1 G/DL


(3.4-5.0)


 


Globulin 4.2 g/dL  


 


Albumin/Globulin Ratio 1.0 (1.0-2.7)  


 


Lipase


  227 U/L


()











Last Vital Signs








  Date Time  Temp Pulse Resp B/P (MAP) Pulse Ox O2 Delivery O2 Flow Rate FiO2


 


1/6/19 17:55 98.8 88 20 137/103 98 Room Air  








Status:  improved


Disposition:  HOME, SELF-CARE


Condition:  Improved


Scripts


Ondansetron Odt* (ZOFRAN ODT*) 4 Mg Tab.rapdis


4 MG BC EVERY 8 HOURS, #6 TAB 0 Refills


   Prov: Gordon Stoddard MD         1/6/19 


Tramadol Hcl* (ULTRAM*) 50 Mg Tablet


50 MG ORAL Q6H PRN for For Pain, #10 TAB 0 Refills


   Prov: Gordon Stoddard MD         1/6/19


Referrals:  


NOT CHOSEN [facility]/MD,REFERRING (PCP)











Gordon Stoddard MD Jan 6, 2019 16:21

## 2019-01-06 NOTE — NUR
ED Nurse Note:



Pt is clear to be dsicharged by ERMD. Discharge paper and prescription given, 
pt verbalized understanding of discharge instruction. AOx4, VSS. Wristband and 
IV removed. Pt ambulated out with steady gait with all belongings.

## 2019-01-06 NOTE — NUR
ED Nurse Note:



Pt came in from home due to abdominal pain lateral sides with nausea, vomitting 
x 2 days. Pain 10/10 shun. Last bowel movement was this morning. Will cont to 
monitor.

## 2019-01-10 ENCOUNTER — HOSPITAL ENCOUNTER (EMERGENCY)
Dept: HOSPITAL 72 - EMR | Age: 29
Discharge: HOME | End: 2019-01-10
Payer: MEDICAID

## 2019-01-10 VITALS — DIASTOLIC BLOOD PRESSURE: 88 MMHG | SYSTOLIC BLOOD PRESSURE: 146 MMHG

## 2019-01-10 VITALS — BODY MASS INDEX: 23.92 KG/M2 | WEIGHT: 135 LBS | HEIGHT: 63 IN

## 2019-01-10 VITALS — DIASTOLIC BLOOD PRESSURE: 85 MMHG | SYSTOLIC BLOOD PRESSURE: 144 MMHG

## 2019-01-10 VITALS — SYSTOLIC BLOOD PRESSURE: 146 MMHG | DIASTOLIC BLOOD PRESSURE: 88 MMHG

## 2019-01-10 DIAGNOSIS — R10.13: Primary | ICD-10-CM

## 2019-01-10 PROCEDURE — 99283 EMERGENCY DEPT VISIT LOW MDM: CPT

## 2019-01-10 NOTE — NUR
PT is Dc per Md order. pt vital signs is stable. pt is alert and oriented times 
4, pt is able to ambulate with steady gait. pt has left with all belongings, as 
well as DC notes and prescriptions. pt is able to teach back DC notes and 
prescriptions. pt vital signs, status, and condition was reported to ERMD prior 
to DC. pt is instructed to follow up with primary provider as soon as possible. 
pt is instructed to return to ER as soon as possible if any abnormalities.

## 2019-01-10 NOTE — NUR
ED Nurse Note:pt. came with abd pain nausea no vomiting on arrival, A/Ox4, 
ambulatory with steady gait , was seen by ER MD

## 2019-01-11 NOTE — EMERGENCY ROOM REPORT
History of Present Illness


General


Chief Complaint:  Abdominal Pain


Source:  Patient





Present Illness


HPI


Patient presents with complaints of epigastric mid abdominal pain


Reports increased nausea after drinking alcohol





Denies any chest pain denies any back or flank pain denies any diarrhea denies 

any fevers patient has had several presentations with initial admission


And repeat follow-ups at the emergency room





Denies any recent travel or trauma


Pain is not as severe as previous however he felt some discomfort in the 

epigastric region


Allergies:  


Coded Allergies:  


     No Known Allergies (Unverified , 11/3/18)





Patient History


Past Medical History:  see triage record


Pertinent Family History:  none


Reviewed Nursing Documentation:  PMH: Agreed; PSxH: Agreed





Nursing Documentation-PM


Past Medical History:  No History, Except For


Hx Cardiac Problems:  No


Hx Gastrointestinal Problems:  Yes - pancreatitis


Hx Neurological Problems:  No





Review of Systems


All Other Systems:  negative except mentioned in HPI





Physical Exam





Vital Signs








  Date Time  Temp Pulse Resp B/P (MAP) Pulse Ox O2 Delivery O2 Flow Rate FiO2


 


1/10/19 10:37 98.4 100 16 144/85 94 Room Air  








Sp02 EP Interpretation:  reviewed, normal


General Appearance:  well appearing, no apparent distress


Head:  normocephalic, atraumatic


Eyes:  bilateral eye PERRL, bilateral eye EOMI


ENT:  hearing grossly normal, normal pharynx, TMs + canals normal, uvula midline


Neck:  full range of motion, supple, no meningismus, no bony tend


Respiratory:  lungs clear, normal breath sounds, no rhonchi, no respiratory 

distress, no retraction, no accessory muscle use


Cardiovascular #1:  normal peripheral pulses, regular rate, rhythm, no edema, 

no gallop, no JVD, no murmur


Gastrointestinal:  normal bowel sounds, non tender, soft, no mass, no 

organomegaly, non-distended, no guarding, no hernia, no pulsatile mass, no 

rebound


Genitourinary:  no CVA tenderness


Musculoskeletal:  normal inspection


Neurologic:  oriented x3, responsive, CNs III-XII nml as tested, motor strength/

tone normal, sensory intact


Psychiatric:  mood/affect normal


Skin:  normal color, no rash, warm/dry, palpation normal


Lymphatic:  normal inspection, no adenopathy





Medical Decision Making


Diagnostic Impression:  


 Primary Impression:  


 abdominal pain


ER Course


With the history exam and presentation, multiple differentials considered, 

including but not limited to appendicitis, gastritis, cholecystitis, 

diverticulitis


Patient has multiple blood work and repeat examinations done through the 

emergency room the lipase levels and the examinations have improved on each 

presentation








Patient remains hemodynamically stable here clinical exam reveals a soft 

abdomen I feel patient will benefit from close outpatient follow-up





Last Vital Signs








  Date Time  Temp Pulse Resp B/P (MAP) Pulse Ox O2 Delivery O2 Flow Rate FiO2


 


1/10/19 12:10 98.4 86 16 146/88 99 Room Air  








Status:  improved


Disposition:  HOME, SELF-CARE


Condition:  Improved


Scripts


Famotidine (PEPCID AC) 20 Mg Tablet


20 MG PO DAILY, #12 TAB


   Prov: Barbara Monsalve DO         1/10/19


Referrals:  


NOT CHOSEN IPA/MD,REFERRING (PCP)











[facility]











[facility] + [facility]





Psych ER - [phone]


 Peds ER - [phone]


Patient Instructions:  Abdominal Pain, Adult





Additional Instructions:  


Patient is provided with the discharge instructions notified to follow up with 

primary doctor in the next 2-3 days otherwise return to the er with any 

worsening symptoms.


Please note that this report is being documented using DRAGON technology.  This 

can lead to erroneous entry secondary to incorrect interpretation by the 

dictating instrument.











Barbara Monsalve DO Jan 11, 2019 13:04